# Patient Record
Sex: FEMALE | Race: BLACK OR AFRICAN AMERICAN | Employment: FULL TIME | URBAN - METROPOLITAN AREA
[De-identification: names, ages, dates, MRNs, and addresses within clinical notes are randomized per-mention and may not be internally consistent; named-entity substitution may affect disease eponyms.]

---

## 2018-03-28 PROBLEM — F43.10 POSTTRAUMATIC STRESS DISORDER: Status: ACTIVE | Noted: 2018-03-28

## 2018-03-28 PROBLEM — F33.1 MAJOR DEPRESSIVE DISORDER, RECURRENT EPISODE, MODERATE (HCC): Status: ACTIVE | Noted: 2018-03-28

## 2018-03-28 PROBLEM — F40.10 SOCIAL PHOBIA: Status: ACTIVE | Noted: 2018-03-28

## 2018-05-30 PROBLEM — F33.41 RECURRENT MAJOR DEPRESSION IN PARTIAL REMISSION (HCC): Status: ACTIVE | Noted: 2018-03-28

## 2019-10-16 PROBLEM — F32.9 MAJOR DEPRESSIVE DISORDER: Status: ACTIVE | Noted: 2018-03-28

## 2020-05-04 PROBLEM — F34.0 CYCLOTHYMIA: Status: ACTIVE | Noted: 2018-03-28

## 2020-05-04 PROBLEM — F39 MODERATE MOOD DISORDER (HCC): Status: ACTIVE | Noted: 2018-03-28

## 2021-06-21 LAB
CHLAMYDIA, EXTERNAL: NEGATIVE
N. GONORRHEA, EXTERNAL: NEGATIVE

## 2021-10-15 LAB
CHLAMYDIA, EXTERNAL: NEGATIVE
N. GONORRHEA, EXTERNAL: NEGATIVE

## 2021-11-05 LAB
ANTIBODY SCREEN, EXTERNAL: NEGATIVE
HBSAG, EXTERNAL: NEGATIVE
HIV, EXTERNAL: NON REACTIVE
RPR, EXTERNAL: NON REACTIVE
RUBELLA, EXTERNAL: NORMAL
TYPE, ABO & RH, EXTERNAL: NORMAL

## 2022-03-19 PROBLEM — F40.10 SOCIAL PHOBIA: Status: ACTIVE | Noted: 2018-03-28

## 2022-03-19 PROBLEM — F43.10 POSTTRAUMATIC STRESS DISORDER: Status: ACTIVE | Noted: 2018-03-28

## 2022-03-19 PROBLEM — F34.0 CYCLOTHYMIA: Status: ACTIVE | Noted: 2018-03-28

## 2022-04-25 LAB — GRBS, EXTERNAL: NEGATIVE

## 2022-05-02 ENCOUNTER — HOSPITAL ENCOUNTER (EMERGENCY)
Age: 22
Discharge: HOME | End: 2022-05-02
Attending: OBSTETRICS & GYNECOLOGY | Admitting: OBSTETRICS & GYNECOLOGY
Payer: COMMERCIAL

## 2022-05-02 VITALS
WEIGHT: 195 LBS | HEART RATE: 117 BPM | TEMPERATURE: 99.7 F | DIASTOLIC BLOOD PRESSURE: 71 MMHG | HEIGHT: 64 IN | SYSTOLIC BLOOD PRESSURE: 130 MMHG | RESPIRATION RATE: 16 BRPM | BODY MASS INDEX: 33.29 KG/M2 | OXYGEN SATURATION: 99 %

## 2022-05-02 PROCEDURE — 59025 FETAL NON-STRESS TEST: CPT

## 2022-05-02 PROCEDURE — 99284 EMERGENCY DEPT VISIT MOD MDM: CPT

## 2022-05-02 NOTE — PROGRESS NOTES
1902: pt placed on monitor. EFM and TOCO applied. 1905: SBAR report given to PARVEEN Amin RN. Care turned over at this time.

## 2022-05-02 NOTE — DISCHARGE INSTRUCTIONS
Patient Education     Patient Education   Patient Education      Follow up in office with provider. Counting Your Baby's Kicks: Care Instructions  Overview     Counting your baby's kicks is one way your doctor can tell that your baby is healthy. Most women--especially in a first pregnancy--feel their baby move for the first time between 16 and 22 weeks. The movement may feel like flutters rather than kicks. Your baby may move more at certain times of the day. When you are active, you may notice less kicking than when you are resting. At your prenatal visits, your doctor will ask whether the baby is active. In your last trimester, your doctor may ask you to count the number of times you feel your baby move. Follow-up care is a key part of your treatment and safety. Be sure to make and go to all appointments, and call your doctor if you are having problems. It's also a good idea to know your test results and keep a list of the medicines you take. How do you count fetal kicks? A common method of checking your baby's movement is to note the length of time it takes to count ten movements (such as kicks, flutters, or rolls). Pick your baby's most active time of day to count. This may be any time from morning to evening. If you don't feel 10 movements in an hour, have something to eat or drink and count for another hour. If you don't feel at least 10 movements in the 2-hour period, call your doctor. When should you call for help? Call your doctor now or seek immediate medical care if:    You noticed that your baby has stopped moving or is moving much less than normal.   Watch closely for changes in your health, and be sure to contact your doctor if you have any problems. Where can you learn more? Go to http://www.gray.com/  Enter [id number] in the search box to learn more about \"Counting Your Baby's Kicks: Care Instructions. \"  Current as of: June 16, 2021               Content Version: 13.2  © 2006-2022 [employer]. Care instructions adapted under license by [employer] (which disclaims liability or warranty for this information). If you have questions about a medical condition or this instruction, always ask your healthcare professional. [address] any warranty or liability for your use of this information. Roselie Rocker Contractions: Care Instructions  Your Care Instructions     [place] contractions prepare your uterus for labor. Think of them as a \"warm-up\" exercise that your body does. You may begin to feel them between the 28th and 30th weeks of your pregnancy. But they start as early as the 20th week. [place] contractions usually occur more often during the ninth month. They may go away when you are active and return when you rest. These contractions are like mild contractions of true labor, but they occur less often. (You feel fewer than 8 in an hour.) They don't cause your cervix to open. It may be hard for you to tell the difference between Roselie Rocker contractions and true labor, especially in your first pregnancy. Follow-up care is a key part of your treatment and safety. Be sure to make and go to all appointments, and call your doctor if you are having problems. It's also a good idea to know your test results and keep a list of the medicines you take. How can you care for yourself at home? · Try a warm bath to help relieve muscle tension and reduce pain. · Change positions every 30 minutes. Take breaks if you must sit for a long time. Get up and walk around. · Drink plenty of water. · Taking short walks may help you feel better. Your doctor needs to check any contractions that are getting stronger or closer together. Where can you learn more? Go to http://www.gray.com/  Enter Z402 in the search box to learn more about \"[place] Contractions: Care Instructions. \"  Current as of: June 16, 2021               Content Version: 13.2  © 6927-9807 [employer]. Care instructions adapted under license by [employer] (which disclaims liability or warranty for this information). If you have questions about a medical condition or this instruction, always ask your healthcare professional. [place] any warranty or liability for your use of this information. Week 40 of Your Pregnancy: Care Instructions  Overview     You are near the end of your pregnancy--and you're probably pretty uncomfortable. It may be harder to walk around. Lying down probably isn't comfortable either. You may have trouble getting to sleep or staying asleep. Most babies are born between 40 and 41 weeks. This is a good time to think about packing a bag for the hospital with items you'll need. Then you'll be ready when labor starts. Follow-up care is a key part of your treatment and safety. Be sure to make and go to all appointments, and call your doctor if you are having problems. It's also a good idea to know your test results and keep a list of the medicines you take. How can you care for yourself at home? Learn about breastfeeding  · Breastfeeding is best for your baby and good for you. · Breast milk has antibodies to help your baby fight infections. · If you breastfeed, you may lose weight faster. That's because making milk burns calories. · Learning the best ways to hold your baby will make breastfeeding easier. · Sometimes breastfeeding can make partners feel left out. If you have a partner, plan how you can care for your baby together. For example, your partner can bathe and diaper the baby. You can snuggle together when you breastfeed. · You may want to learn how to use a breast pump and store your milk. · If you choose to bottle feed, make the feeding feel like breastfeeding so you can bond with your baby. Always hold your baby and the bottle.  Don't prop bottles or let your baby fall asleep with a bottle. Learn about crying  · It's common for babies to cry for 1 to 3 hours a day. Some cry more, and some cry less. · Babies don't cry to make you upset or because you're a bad parent. · Crying is how your baby communicates. Your baby may be hungry; have gas; need a diaper change; or feel cold, warm, tired, lonely, or tense. Sometimes babies cry for unknown reasons. · If you respond to your baby's needs, your baby will learn to trust you. · Try to stay calm when your baby cries. Your baby may get more upset if they sense that you are upset. Know how to care for your   · Your baby's umbilical cord stump will drop off on its own, usually between 1 and 2 weeks. To care for your baby's umbilical cord area:  ? Clean the area at the bottom of the cord 2 or 3 times a day. ? Pay special attention to the area where the cord attaches to the skin. ? Keep the diaper folded below the cord. ? Use a damp washcloth or cotton ball to sponge bathe your baby until the stump has come off. · Your baby's first dark stool is called meconium. After the meconium is passed, your baby will develop their own bowel pattern. ? Some babies, especially  babies, have several bowel movements a day. Others have one or two a day, or one every 2 to 3 days. ?  babies often have loose, yellow stools. Formula-fed babies have more formed stools. ? If your baby's stools look like little pellets, your baby is constipated. After 2 days of constipation, call your baby's doctor. · If your baby will be circumcised, you can care for your baby at home. ? Gently rinse your baby's penis with warm water after every diaper change. Don't try to remove the film that forms on the penis. This film will go away on its own. Pat dry. ? Put petroleum ointment, such as Vaseline, on the area of the diaper that will touch your baby's penis.  This will keep the diaper from sticking to your baby.  ? Ask the doctor about giving your baby acetaminophen (Tylenol) for pain. Where can you learn more? Go to http://www.gray.com/  Enter N257 in the search box to learn more about \"Week 37 of Your Pregnancy: Care Instructions. \"  Current as of: June 16, 2021               Content Version: 13.2  © 3183-4850 [employer]. Care instructions adapted under license by [employer] (which disclaims liability or warranty for this information). If you have questions about a medical condition or this instruction, always ask your healthcare professional. [place] [address] any warranty or liability for your use of this information.

## 2022-05-02 NOTE — PROGRESS NOTES
Qamar Monique is a 23 y.o.   at Unknown patient of Dr Nikki Fraga at [facility] who presents to L&D triage with c/o contractions every 10 minutes for 2 and half hours and loss of mucus plug. She reports Positive FM, denies vaginal bleeding and LOF. She also denies Headaches, Scotoma, RUQ pain and Edema. Urine sample obtained. EFM and toco placed for initial assessment. Pt reports sexual activity in the past 48 hours. Pt was last seen on 22 and reports cervical dilation of 1 cm.       :  Dr. Wade Crest in with pt. Strip reviewed. SVE performed with consent from pt. POC to discharge pt home at this time. :  Discharge instructions reviewed with pt. No questions at this time. Pt discharged home.

## 2022-05-03 NOTE — PROCEDURES
Non-Stress Test    Brief history, indication: 36 week IUP, contractions    Findings:  Baseline  bpm; moderate variability; greater than two accelerations to 150 bpm; no significant decelerations noted.     Result: Reactive NST    Nonstress test interpreted by myself      Tika Martin MD

## 2022-05-03 NOTE — H&P
OB History & Physical    Name: Joe Vasquez MRN: [id number]  SSN: [id number]    YOB: 2000  Age: 23 y.o. Sex: female      Subjective:     Reason for Admission:  Pregnancy and contractions    History of Present Illness: Joe Vasquez is a 23 y.o.  female with an estimated gestational age of 36w7d with Estimated Date of Delivery: 22. Patient complains of gradually increasing contractions since earlier this afternoon. She denies bleeding, ROM. The baby has been active. OB History        2    Para        Term                AB   1    Living           SAB   1    IAB        Ectopic        Molar        Multiple        Live Births                  Past Medical History:   Diagnosis Date    Asthma     Hx of multiple concussions     while playing soccer    Hx of streptococcal infection     7 X last year    Psychiatric problem     Bipolar. Anxiety, Depression     Past Surgical History:   Procedure Laterality Date    HX KNEE ARTHROSCOPY      HX OTHER SURGICAL      Ligament reconstruction in left elbow    HX OTHER SURGICAL      Cyst removal of left ankle    HX TONSILLECTOMY  2018     Social History     Occupational History    Not on file   Tobacco Use    Smoking status: Never Smoker    Smokeless tobacco: Never Used   Substance and Sexual Activity    Alcohol use: Never    Drug use: Never    Sexual activity: Yes     Partners: Male     Birth control/protection: Implant     Family History   Problem Relation Age of Onset    Other Father         incarcerated for drugs and conspiracy to murder     SH:  Patient denies tobacco, alcohol, recreational drugs. .     FH: reviewed and negative    Review of systems:    General: Denies fever, sweats, chills  CV: Denies CP, palpitations  Resp: Denies SOB, cough  GI: Denies nausea, vomiting, diarrhea  : Denies dysura, abnormal frequency, hematuria  Neuro: Denies HA, visual disturbance  All other systems reviewed and are negative    No Known Allergies  Prior to Admission medications    Medication Sig Start Date End Date Taking? Authorizing Provider   PNV Comb #2-Iron-FA-Omega 3 29-1-400 mg cmpk Take  by mouth. Yes Provider, Historical   beclomethasone (QVAR) 40 mcg/actuation aero Take 2 Puffs by inhalation. 17  Yes Provider, Historical   lamoTRIgine (LaMICtal) 100 mg tablet Take 1 Tab by mouth nightly. After 2 weeks increase to 1.5 tabs  Patient not taking: Reported on 2022   Dre Dc, TIMMY   albuterol (VENTOLIN HFA) 90 mcg/actuation inhaler Take 2 Puffs by inhalation. 17   Provider, Historical   montelukast (SINGULAIR) 10 mg tablet Take 10 mg by mouth. Patient not taking: Reported on 2022   Provider, Historical   tazorotene (TAZORAC) 0.05 % topical cream  18   Provider, Historical   etonogestrel (NEXPLANON) 68 mg impl by SubDERmal route.   Patient not taking: Reported on 2022    Provider, Historical        Objective:     Vitals:    Vitals:    22 1907 22 1914 22 1938 22 1940   BP: 128/76   130/71   Pulse: (!) 136   (!) 117   Resp: 16      Temp: 99.7 °F (37.6 °C)      SpO2: 98%  99%    Weight:  88.5 kg (195 lb)     Height:  5' 4\" (1.626 m)        Temp (24hrs), Av.7 °F (37.6 °C), Min:99.7 °F (37.6 °C), Max:99.7 °F (37.6 °C)    BP  Min: 128/76  Max: 130/71     Physical Exam  General: in NAD, psychologically stable  Neck: normal ROM  Back: no CVAT  Heart: regular rate and rhythm  Respiratory: unlabored breathing  Abdomen: Gravid, soft, nontender, no abnormal masses, rebound, rigidity, guarding  Fundus: soft, nontender  Extremities: no abnormal cyanosis, clubbing, edema  Skin: warm, dry, no abnormal lesions noted  Neurological: DTR's 1-2+ no clonus  Pelvic:Cervical Exam: /-2  Uterine Activity: occasional contractions, palpate mild  Membranes: no gross evidence of ROM  Fetal Heart Rate: adequate variability and reactivity; no significant abnormal decelerations    Patient Active Problem List   Diagnosis Code    Asthma, cough variant J45.991    Seasonal allergic rhinitis J30.2    Social phobia F40.10    Cyclothymia F34.0    Posttraumatic stress disorder F43.10     Assessment and Plan:     36 week IUP, false labor  Discharge home  Follow-up with the office tomorrow morning  Precautions reviewed; questions answered    Signed By:  Kip Burroughs MD     May 2, 2022

## 2022-05-04 ENCOUNTER — HOSPITAL ENCOUNTER (EMERGENCY)
Age: 22
Discharge: HOME | End: 2022-05-05
Attending: OBSTETRICS & GYNECOLOGY | Admitting: OBSTETRICS & GYNECOLOGY
Payer: COMMERCIAL

## 2022-05-04 VITALS
TEMPERATURE: 98.2 F | HEART RATE: 105 BPM | DIASTOLIC BLOOD PRESSURE: 63 MMHG | OXYGEN SATURATION: 97 % | BODY MASS INDEX: 33.46 KG/M2 | WEIGHT: 196 LBS | SYSTOLIC BLOOD PRESSURE: 129 MMHG | HEIGHT: 64 IN | RESPIRATION RATE: 16 BRPM

## 2022-05-05 PROCEDURE — 99284 EMERGENCY DEPT VISIT MOD MDM: CPT

## 2022-05-05 NOTE — DISCHARGE INSTRUCTIONS
Patient Education        Week 40 of Your Pregnancy: Care Instructions  Overview     You are near the end of your pregnancy--and you're probably pretty uncomfortable. It may be harder to walk around. Lying down probably isn't comfortable either. You may have trouble getting to sleep or staying asleep. Most babies are born between 40 and 41 weeks. This is a good time to think about packing a bag for the hospital with items you'll need. Then you'll be ready when labor starts. Follow-up care is a key part of your treatment and safety. Be sure to make and go to all appointments, and call your doctor if you are having problems. It's also a good idea to know your test results and keep a list of the medicines you take. How can you care for yourself at home? Learn about breastfeeding  · Breastfeeding is best for your baby and good for you. · Breast milk has antibodies to help your baby fight infections. · If you breastfeed, you may lose weight faster. That's because making milk burns calories. · Learning the best ways to hold your baby will make breastfeeding easier. · Sometimes breastfeeding can make partners feel left out. If you have a partner, plan how you can care for your baby together. For example, your partner can bathe and diaper the baby. You can snuggle together when you breastfeed. · You may want to learn how to use a breast pump and store your milk. · If you choose to bottle feed, make the feeding feel like breastfeeding so you can bond with your baby. Always hold your baby and the bottle. Don't prop bottles or let your baby fall asleep with a bottle. Learn about crying  · It's common for babies to cry for 1 to 3 hours a day. Some cry more, and some cry less. · Babies don't cry to make you upset or because you're a bad parent. · Crying is how your baby communicates. Your baby may be hungry; have gas; need a diaper change; or feel cold, warm, tired, lonely, or tense.  Sometimes babies cry for unknown reasons. · If you respond to your baby's needs, your baby will learn to trust you. · Try to stay calm when your baby cries. Your baby may get more upset if they sense that you are upset. Know how to care for your   · Your baby's umbilical cord stump will drop off on its own, usually between 1 and 2 weeks. To care for your baby's umbilical cord area:  ? Clean the area at the bottom of the cord 2 or 3 times a day. ? Pay special attention to the area where the cord attaches to the skin. ? Keep the diaper folded below the cord. ? Use a damp washcloth or cotton ball to sponge bathe your baby until the stump has come off. · Your baby's first dark stool is called meconium. After the meconium is passed, your baby will develop their own bowel pattern. ? Some babies, especially  babies, have several bowel movements a day. Others have one or two a day, or one every 2 to 3 days. ?  babies often have loose, yellow stools. Formula-fed babies have more formed stools. ? If your baby's stools look like little pellets, your baby is constipated. After 2 days of constipation, call your baby's doctor. · If your baby will be circumcised, you can care for your baby at home. ? Gently rinse your baby's penis with warm water after every diaper change. Don't try to remove the film that forms on the penis. This film will go away on its own. Pat dry. ? Put petroleum ointment, such as Vaseline, on the area of the diaper that will touch your baby's penis. This will keep the diaper from sticking to your baby. ? Ask the doctor about giving your baby acetaminophen (Tylenol) for pain. Where can you learn more? Go to http://www.gray.com/  Enter N257 in the search box to learn more about \"Week 37 of Your Pregnancy: Care Instructions. \"  Current as of: 2021               Content Version: 13.2  © 3583-2567 [employer].    Care instructions adapted under license by [employer] (which disclaims liability or warranty for this information). If you have questions about a medical condition or this instruction, always ask your healthcare professional. [address] any warranty or liability for your use of this information.

## 2022-05-05 NOTE — PROGRESS NOTES
[address] [facility] Loida Perkins is a 23 y.o.   at 37w2d patient of Dr Vinay Rutledge at [facility] who presents to L&D triage with c/o Contractions. She reports Positive FM, denies vaginal bleeding and LOF. She also denies Headaches, Scotoma, RUQ pain and Edema. Urine sample obtained. EFM and toco placed for initial assessment. 2348. Dr. Ernestine Honeycutt at the bedside to assess patient and her cervix. Cervix unchanged from last visit. Verbal orders received to let patient walk or roll on birthing ball for about an hour and he will re-check her cervix then.    0003. Patient placed onto birthing ball and taken off of fetal monitoring for a reactive strip.     0053. Dr. Ernestine Honeycutt at the bedside to assess patients cervix again. Cervix unchanged. Verbal orders received to discharge patient home. 0110. Patient discharged home with instructions. Patient understands discharge instructions and has had all questions answered.

## 2022-05-05 NOTE — H&P
OB History & Physical    Name: Maegan Medrano MRN: [id number]  SSN: [id number]    YOB: 2000  Age: 23 y.o. Sex: female      Subjective:     Reason for Admission:  Pregnancy and  contractions    History of Present Illness: Maegan Medrano is a 23 y.o.  female with an estimated gestational age of 42w2d with Estimated Date of Delivery: 22. Patient complains of cramping and contractions since this morning. She reports mucous discharge, no bleeding, ROM. The baby has been active. OB History        2    Para        Term                AB   1    Living           SAB   1    IAB        Ectopic        Molar        Multiple        Live Births                  Past Medical History:   Diagnosis Date    Asthma     Hx of multiple concussions     while playing soccer    Hx of streptococcal infection     7 X last year    Psychiatric problem     Bipolar. Anxiety, Depression     Past Surgical History:   Procedure Laterality Date    HX KNEE ARTHROSCOPY      HX OTHER SURGICAL      Ligament reconstruction in left elbow    HX OTHER SURGICAL      Cyst removal of left ankle    HX TONSILLECTOMY  2018     Social History     Occupational History    Not on file   Tobacco Use    Smoking status: Never Smoker    Smokeless tobacco: Never Used   Substance and Sexual Activity    Alcohol use: Never    Drug use: Never    Sexual activity: Yes     Partners: Male     Birth control/protection: Implant     Family History   Problem Relation Age of Onset    Other Father         incarcerated for drugs and conspiracy to murder     SH:  Patient denies tobacco, alcohol, recreational drugs. .     FH: reviewed and negative    Review of systems:    General: Denies fever, sweats, chills  CV: Denies CP, palpitations  Resp: Denies SOB, cough  GI: Denies nausea, vomiting, diarrhea  : Denies dysura, abnormal frequency, hematuria  Neuro: Denies HA, visual disturbance  All other systems reviewed and are negative    No Known Allergies  Prior to Admission medications    Medication Sig Start Date End Date Taking? Authorizing Provider   PNV Comb #2-Iron-FA-Omega 3 29-1-400 mg cmpk Take  by mouth. Provider, Historical   lamoTRIgine (LaMICtal) 100 mg tablet Take 1 Tab by mouth nightly. After 2 weeks increase to 1.5 tabs  Patient not taking: Reported on 2022   Jony Mehta NP   albuterol (VENTOLIN HFA) 90 mcg/actuation inhaler Take 2 Puffs by inhalation. 17   Provider, Historical   beclomethasone (QVAR) 40 mcg/actuation aero Take 2 Puffs by inhalation. 17   Provider, Historical   montelukast (SINGULAIR) 10 mg tablet Take 10 mg by mouth. Patient not taking: Reported on 2022   Provider, Historical   tazorotene (TAZORAC) 0.05 % topical cream  18   Provider, Historical   etonogestrel (NEXPLANON) 68 mg impl by SubDERmal route.   Patient not taking: Reported on 2022    Provider, Historical        Objective:     Vitals:    Vitals:    22 2335 22 2346   BP: 129/63    Pulse: (!) 105    Resp: 16    Temp: 98.2 °F (36.8 °C)    SpO2: 97%    Weight:  88.9 kg (196 lb)   Height:  5' 4\" (1.626 m)      Temp (24hrs), Av.2 °F (36.8 °C), Min:98.2 °F (36.8 °C), Max:98.2 °F (36.8 °C)    BP  Min: 129/63  Max: 129/63     Physical Exam  General: in NAD, psychologically stable  Neck: normal ROM  Back: no CVAT  Heart: regular rate and rhythm  Respiratory: unlabored breathing  Abdomen: Gravid, soft, nontender, no abnormal masses, rebound, rigidity, guarding  Fundus: soft, nontender  Extremities: no abnormal cyanosis, clubbing, edema  Skin: warm, dry, no abnormal lesions noted  Neurological: DTR's 1-2+ no clonus  Pelvic:Cervical Exam: 1-2/70/-2  Uterine Activity: irregular  Membranes: no gross evidence of ROM  Fetal Heart Rate: adequate variability and reactivity; no significant abnormal decelerations    Patient Active Problem List   Diagnosis Code    Asthma, cough variant J45.991    Seasonal allergic rhinitis J30.2    Social phobia F40.10    Cyclothymia F34.0    Posttraumatic stress disorder F43.10     Assessment and Plan:     37 week IUP, false labor---patient was observed over 1 1/2 hours, no change in contractions, cervix recheck no change  Discharge home  Follow-up scheduled in the office Thursday  Precautions discussed; questions answered.     Signed By:  Regino Jaeger MD     May 5, 2022

## 2022-05-19 ENCOUNTER — HOSPITAL ENCOUNTER (INPATIENT)
Age: 22
LOS: 3 days | Discharge: HOME | DRG: 540 | End: 2022-05-22
Attending: OBSTETRICS & GYNECOLOGY | Admitting: OBSTETRICS & GYNECOLOGY
Payer: COMMERCIAL

## 2022-05-19 DIAGNOSIS — G89.18 POST-OP PAIN: Primary | ICD-10-CM

## 2022-05-19 PROBLEM — Z34.90 PREGNANCY: Status: ACTIVE | Noted: 2022-05-19

## 2022-05-19 LAB
ABO + RH BLD: NORMAL
AMPHET UR QL SCN: NEGATIVE
BARBITURATES UR QL SCN: NEGATIVE
BASOPHILS # BLD: 0 K/UL (ref 0–0.1)
BASOPHILS NFR BLD: 0 % (ref 0–1)
BENZODIAZ UR QL: NEGATIVE
BLOOD GROUP ANTIBODIES SERPL: NORMAL
CANNABINOIDS UR QL SCN: NEGATIVE
COCAINE UR QL SCN: NEGATIVE
DIFFERENTIAL METHOD BLD: ABNORMAL
DRUG SCRN COMMENT,DRGCM: NORMAL
EOSINOPHIL # BLD: 0.2 K/UL (ref 0–0.4)
EOSINOPHIL NFR BLD: 1 % (ref 0–7)
ERYTHROCYTE [DISTWIDTH] IN BLOOD BY AUTOMATED COUNT: 14.1 % (ref 11.5–14.5)
HCT VFR BLD AUTO: 38.5 % (ref 35–47)
HGB BLD-MCNC: 12.4 G/DL (ref 11.5–16)
IMM GRANULOCYTES # BLD AUTO: 0.1 K/UL (ref 0–0.04)
IMM GRANULOCYTES NFR BLD AUTO: 1 % (ref 0–0.5)
LYMPHOCYTES # BLD: 2.6 K/UL (ref 0.8–3.5)
LYMPHOCYTES NFR BLD: 20 % (ref 12–49)
MCH RBC QN AUTO: 30.5 PG (ref 26–34)
MCHC RBC AUTO-ENTMCNC: 32.2 G/DL (ref 30–36.5)
MCV RBC AUTO: 94.6 FL (ref 80–99)
METHADONE UR QL: NEGATIVE
MONOCYTES # BLD: 1.2 K/UL (ref 0–1)
MONOCYTES NFR BLD: 9 % (ref 5–13)
NEUTS SEG # BLD: 9.3 K/UL (ref 1.8–8)
NEUTS SEG NFR BLD: 69 % (ref 32–75)
NRBC # BLD: 0 K/UL (ref 0–0.01)
NRBC BLD-RTO: 0 PER 100 WBC
OPIATES UR QL: NEGATIVE
PCP UR QL: NEGATIVE
PLATELET # BLD AUTO: 347 K/UL (ref 150–400)
PMV BLD AUTO: 10.4 FL (ref 8.9–12.9)
RBC # BLD AUTO: 4.07 M/UL (ref 3.8–5.2)
SPECIMEN EXP DATE BLD: NORMAL
WBC # BLD AUTO: 13.4 K/UL (ref 3.6–11)

## 2022-05-19 PROCEDURE — 74011250637 HC RX REV CODE- 250/637: Performed by: OBSTETRICS & GYNECOLOGY

## 2022-05-19 PROCEDURE — 36415 COLL VENOUS BLD VENIPUNCTURE: CPT

## 2022-05-19 PROCEDURE — 65410000002 HC RM PRIVATE OB

## 2022-05-19 PROCEDURE — 74011250636 HC RX REV CODE- 250/636: Performed by: OBSTETRICS & GYNECOLOGY

## 2022-05-19 PROCEDURE — 86900 BLOOD TYPING SEROLOGIC ABO: CPT

## 2022-05-19 PROCEDURE — 80307 DRUG TEST PRSMV CHEM ANLYZR: CPT

## 2022-05-19 PROCEDURE — 85025 COMPLETE CBC W/AUTO DIFF WBC: CPT

## 2022-05-19 RX ORDER — ONDANSETRON 2 MG/ML
4 INJECTION INTRAMUSCULAR; INTRAVENOUS
Status: DISCONTINUED | OUTPATIENT
Start: 2022-05-19 | End: 2022-05-21 | Stop reason: HOSPADM

## 2022-05-19 RX ORDER — SODIUM CHLORIDE 0.9 % (FLUSH) 0.9 %
5-40 SYRINGE (ML) INJECTION AS NEEDED
Status: DISCONTINUED | OUTPATIENT
Start: 2022-05-19 | End: 2022-05-22 | Stop reason: HOSPADM

## 2022-05-19 RX ORDER — SODIUM CHLORIDE 0.9 % (FLUSH) 0.9 %
5-40 SYRINGE (ML) INJECTION EVERY 8 HOURS
Status: DISCONTINUED | OUTPATIENT
Start: 2022-05-19 | End: 2022-05-20

## 2022-05-19 RX ORDER — NALOXONE HYDROCHLORIDE 0.4 MG/ML
0.4 INJECTION, SOLUTION INTRAMUSCULAR; INTRAVENOUS; SUBCUTANEOUS AS NEEDED
Status: DISCONTINUED | OUTPATIENT
Start: 2022-05-19 | End: 2022-05-20

## 2022-05-19 RX ORDER — ACETAMINOPHEN 500 MG
1000 TABLET ORAL EVERY 8 HOURS
Status: DISCONTINUED | OUTPATIENT
Start: 2022-05-19 | End: 2022-05-20

## 2022-05-19 RX ORDER — MAG HYDROX/ALUMINUM HYD/SIMETH 200-200-20
30 SUSPENSION, ORAL (FINAL DOSE FORM) ORAL
Status: DISCONTINUED | OUTPATIENT
Start: 2022-05-19 | End: 2022-05-21 | Stop reason: HOSPADM

## 2022-05-19 RX ORDER — OXYTOCIN/RINGER'S LACTATE 30/500 ML
10 PLASTIC BAG, INJECTION (ML) INTRAVENOUS AS NEEDED
Status: DISCONTINUED | OUTPATIENT
Start: 2022-05-19 | End: 2022-05-22 | Stop reason: HOSPADM

## 2022-05-19 RX ORDER — OXYTOCIN/RINGER'S LACTATE 30/500 ML
87.3 PLASTIC BAG, INJECTION (ML) INTRAVENOUS AS NEEDED
Status: DISCONTINUED | OUTPATIENT
Start: 2022-05-19 | End: 2022-05-22 | Stop reason: HOSPADM

## 2022-05-19 RX ORDER — BUTORPHANOL TARTRATE 2 MG/ML
1 INJECTION INTRAMUSCULAR; INTRAVENOUS
Status: DISCONTINUED | OUTPATIENT
Start: 2022-05-19 | End: 2022-05-21 | Stop reason: HOSPADM

## 2022-05-19 RX ORDER — SODIUM CHLORIDE, SODIUM LACTATE, POTASSIUM CHLORIDE, CALCIUM CHLORIDE 600; 310; 30; 20 MG/100ML; MG/100ML; MG/100ML; MG/100ML
125 INJECTION, SOLUTION INTRAVENOUS CONTINUOUS
Status: DISCONTINUED | OUTPATIENT
Start: 2022-05-19 | End: 2022-05-22 | Stop reason: HOSPADM

## 2022-05-19 RX ADMIN — BUTORPHANOL TARTRATE 1 MG: 2 INJECTION, SOLUTION INTRAMUSCULAR; INTRAVENOUS at 19:36

## 2022-05-19 RX ADMIN — Medication 25 MCG: at 18:23

## 2022-05-19 NOTE — PROGRESS NOTES
Problem: Vaginal Delivery: Day of Deliver-Laboring  Goal: Activity/Safety  Outcome: Progressing Towards Goal  Goal: Nutrition/Diet  Outcome: Progressing Towards Goal  Goal: Medications  Outcome: Progressing Towards Goal  Goal: Respiratory  Outcome: Progressing Towards Goal  Goal: Treatments/Interventions/Procedures  Outcome: Progressing Towards Goal  Goal: *Vital signs within defined limits  Outcome: Progressing Towards Goal  Goal: *Labs within defined limits  Outcome: Progressing Towards Goal

## 2022-05-19 NOTE — H&P
History & Physical    Name: Loida Perkins MRN: [id number]  SSN: [id number]    YOB: 2000  Age: 23 y.o. Sex: female      Subjective:     Estimated Date of Delivery: 22  OB History    Para Term  AB Living   2       1     SAB IAB Ectopic Molar Multiple Live Births   1                # Outcome Date GA Lbr Ulises/2nd Weight Sex Delivery Anes PTL Lv   2 Current            1 SAB 22               Ms. Jerry Ontiveros is admitted with pregnancy at 39w2d for induction of labor due to elective. Prenatal course was normal.  Please see prenatal records for details. Past Medical History:   Diagnosis Date    Asthma     Hx of multiple concussions     while playing soccer    Hx of streptococcal infection     7 X last year    Psychiatric problem     Bipolar. Anxiety, Depression     Past Surgical History:   Procedure Laterality Date    HX KNEE ARTHROSCOPY      HX OTHER SURGICAL      Ligament reconstruction in left elbow    HX OTHER SURGICAL      Cyst removal of left ankle    HX TONSILLECTOMY  2018     Social History     Occupational History    Not on file   Tobacco Use    Smoking status: Never Smoker    Smokeless tobacco: Never Used   Substance and Sexual Activity    Alcohol use: Never    Drug use: Never    Sexual activity: Yes     Partners: Male     Birth control/protection: Implant     Family History   Problem Relation Age of Onset    Other Father         incarcerated for drugs and conspiracy to murder       No Known Allergies  Prior to Admission medications    Medication Sig Start Date End Date Taking? Authorizing Provider   PNV Comb #2-Iron-FA-Omega 3 29-1-400 mg cmpk Take  by mouth. Yes Provider, Historical   beclomethasone (QVAR) 40 mcg/actuation aero Take 2 Puffs by inhalation. 17  Yes Provider, Historical   lamoTRIgine (LaMICtal) 100 mg tablet Take 1 Tab by mouth nightly.  After 2 weeks increase to 1.5 tabs  Patient not taking: Reported on 2022   Olinda TIMMY Benoit   albuterol (VENTOLIN HFA) 90 mcg/actuation inhaler Take 2 Puffs by inhalation. 17   Provider, Historical   montelukast (SINGULAIR) 10 mg tablet Take 10 mg by mouth. Patient not taking: Reported on 2022   Provider, Historical   tazorotene (TAZORAC) 0.05 % topical cream  18   Provider, Historical   etonogestrel (NEXPLANON) 68 mg impl by SubDERmal route. Patient not taking: Reported on 2022    Provider, Historical        Review of Systems: A comprehensive review of systems was negative except for that written in the History of Present Illness. Objective:     Vitals:  Vitals:    22 1623   BP: 124/73   Pulse: 94   Resp: 16   Temp: 98.2 °F (36.8 °C)   SpO2: 98%        Physical Exam:  Patient without distress. Abdomen: soft, nontender  Cervical Exam: 1/50/-3. FB placed without difficulty. 60 uterine/60 vaginal   Lower Extremities:  - Edema 1+  Membranes:  Intact  Fetal Heart Rate: Reactive          Prenatal Labs:   No results found for: ABORH, RUBELLAEXT, HBSAGEXT, HIVEXT, RPREXT, GONNOEXT, CHLAMEXT, ABORHEXT, RUBELLAEXT, GRBSEXT, HBSAGEXT, HIVEXT, RPREXT, GONNOEXT, CHLAMEXT    Impression/Plan:   Jenny Schwartz is a 23 y.o.  at 44w2d   IOL, elective   GBS negative       Plan:   Admit for induction of labor  FB   Buccal cytotec q4 hours     Jessi Shrestha, [id number]

## 2022-05-19 NOTE — PROGRESS NOTES
1915: Bedside and Verbal shift change report given to BARAK Baldwin, RN and Aubrey Arenas, RN  (oncoming nurse) by KRISTA Jones RN  (offgoing nurse). Report included the following information SBAR and Kardex. [id number]: Difficulty tracing. RN at bedside to adjust EFM. 0720: Bedside and Verbal shift change report given to MAUREEN Pete, SARAH and SAGE Parks RN  (oncoming nurse) by BARAK Baldwin RN and Aubrey Arenas RN  (offgoing nurse).  Report included the following information SBAR and Kardex. '

## 2022-05-20 ENCOUNTER — ANESTHESIA EVENT (OUTPATIENT)
Dept: LABOR AND DELIVERY | Age: 22
DRG: 540 | End: 2022-05-20
Payer: COMMERCIAL

## 2022-05-20 ENCOUNTER — ANESTHESIA (OUTPATIENT)
Dept: LABOR AND DELIVERY | Age: 22
DRG: 540 | End: 2022-05-20
Payer: COMMERCIAL

## 2022-05-20 PROCEDURE — 65410000002 HC RM PRIVATE OB

## 2022-05-20 PROCEDURE — 10907ZC DRAINAGE OF AMNIOTIC FLUID, THERAPEUTIC FROM PRODUCTS OF CONCEPTION, VIA NATURAL OR ARTIFICIAL OPENING: ICD-10-PCS | Performed by: OBSTETRICS & GYNECOLOGY

## 2022-05-20 PROCEDURE — 74011000250 HC RX REV CODE- 250

## 2022-05-20 PROCEDURE — 76060000032 HC ANESTHESIA 0.5 TO 1 HR: Performed by: OBSTETRICS & GYNECOLOGY

## 2022-05-20 PROCEDURE — 74011000250 HC RX REV CODE- 250: Performed by: NURSE ANESTHETIST, CERTIFIED REGISTERED

## 2022-05-20 PROCEDURE — 74011250636 HC RX REV CODE- 250/636: Performed by: OBSTETRICS & GYNECOLOGY

## 2022-05-20 PROCEDURE — 74011250637 HC RX REV CODE- 250/637: Performed by: OBSTETRICS & GYNECOLOGY

## 2022-05-20 PROCEDURE — 74011250636 HC RX REV CODE- 250/636: Performed by: NURSE ANESTHETIST, CERTIFIED REGISTERED

## 2022-05-20 PROCEDURE — 74011000250 HC RX REV CODE- 250: Performed by: OBSTETRICS & GYNECOLOGY

## 2022-05-20 PROCEDURE — 74011000250 HC RX REV CODE- 250: Performed by: STUDENT IN AN ORGANIZED HEALTH CARE EDUCATION/TRAINING PROGRAM

## 2022-05-20 PROCEDURE — 77010026065 HC OXYGEN MINIMUM MEDICAL AIR

## 2022-05-20 PROCEDURE — 75410000002 HC LABOR FEE PER 1 HR

## 2022-05-20 PROCEDURE — 76060000078 HC EPIDURAL ANESTHESIA: Performed by: OBSTETRICS & GYNECOLOGY

## 2022-05-20 PROCEDURE — 77030014125 HC TY EPDRL BBMI -B: Performed by: STUDENT IN AN ORGANIZED HEALTH CARE EDUCATION/TRAINING PROGRAM

## 2022-05-20 PROCEDURE — 74011250636 HC RX REV CODE- 250/636: Performed by: ANESTHESIOLOGY

## 2022-05-20 PROCEDURE — 76060000078 HC EPIDURAL ANESTHESIA

## 2022-05-20 PROCEDURE — 76010000391 HC C SECN FIRST 1 HR: Performed by: OBSTETRICS & GYNECOLOGY

## 2022-05-20 PROCEDURE — 75410000003 HC RECOV DEL/VAG/CSECN EA 0.5 HR

## 2022-05-20 RX ORDER — ACETAMINOPHEN 500 MG
1000 TABLET ORAL EVERY 8 HOURS
Status: DISCONTINUED | OUTPATIENT
Start: 2022-05-20 | End: 2022-05-22 | Stop reason: HOSPADM

## 2022-05-20 RX ORDER — PHENYLEPHRINE HCL IN 0.9% NACL 0.4MG/10ML
SYRINGE (ML) INTRAVENOUS AS NEEDED
Status: DISCONTINUED | OUTPATIENT
Start: 2022-05-20 | End: 2022-05-20 | Stop reason: HOSPADM

## 2022-05-20 RX ORDER — HYDROCORTISONE ACETATE PRAMOXINE HCL 2.5; 1 G/100G; G/100G
CREAM TOPICAL AS NEEDED
Status: DISCONTINUED | OUTPATIENT
Start: 2022-05-20 | End: 2022-05-22 | Stop reason: HOSPADM

## 2022-05-20 RX ORDER — OXYTOCIN/RINGER'S LACTATE 30/500 ML
10 PLASTIC BAG, INJECTION (ML) INTRAVENOUS AS NEEDED
Status: DISCONTINUED | OUTPATIENT
Start: 2022-05-20 | End: 2022-05-22 | Stop reason: HOSPADM

## 2022-05-20 RX ORDER — SODIUM CHLORIDE 0.9 % (FLUSH) 0.9 %
5-40 SYRINGE (ML) INJECTION AS NEEDED
Status: DISCONTINUED | OUTPATIENT
Start: 2022-05-20 | End: 2022-05-20

## 2022-05-20 RX ORDER — NALOXONE HYDROCHLORIDE 0.4 MG/ML
0.4 INJECTION, SOLUTION INTRAMUSCULAR; INTRAVENOUS; SUBCUTANEOUS AS NEEDED
Status: DISCONTINUED | OUTPATIENT
Start: 2022-05-20 | End: 2022-05-22 | Stop reason: HOSPADM

## 2022-05-20 RX ORDER — SODIUM CHLORIDE 0.9 % (FLUSH) 0.9 %
5-40 SYRINGE (ML) INJECTION EVERY 8 HOURS
Status: DISCONTINUED | OUTPATIENT
Start: 2022-05-20 | End: 2022-05-22 | Stop reason: HOSPADM

## 2022-05-20 RX ORDER — SIMETHICONE 80 MG
80 TABLET,CHEWABLE ORAL
Status: DISCONTINUED | OUTPATIENT
Start: 2022-05-20 | End: 2022-05-22 | Stop reason: HOSPADM

## 2022-05-20 RX ORDER — NALOXONE HYDROCHLORIDE 0.4 MG/ML
0.2 INJECTION, SOLUTION INTRAMUSCULAR; INTRAVENOUS; SUBCUTANEOUS
Status: ACTIVE | OUTPATIENT
Start: 2022-05-20 | End: 2022-05-21

## 2022-05-20 RX ORDER — DOCUSATE SODIUM 100 MG/1
100 CAPSULE, LIQUID FILLED ORAL DAILY
Status: DISCONTINUED | OUTPATIENT
Start: 2022-05-21 | End: 2022-05-22 | Stop reason: HOSPADM

## 2022-05-20 RX ORDER — BUPIVACAINE HYDROCHLORIDE AND EPINEPHRINE 2.5; 5 MG/ML; UG/ML
INJECTION, SOLUTION EPIDURAL; INFILTRATION; INTRACAUDAL; PERINEURAL AS NEEDED
Status: DISCONTINUED | OUTPATIENT
Start: 2022-05-20 | End: 2022-05-20 | Stop reason: HOSPADM

## 2022-05-20 RX ORDER — ONDANSETRON 2 MG/ML
INJECTION INTRAMUSCULAR; INTRAVENOUS AS NEEDED
Status: DISCONTINUED | OUTPATIENT
Start: 2022-05-20 | End: 2022-05-20 | Stop reason: HOSPADM

## 2022-05-20 RX ORDER — LIDOCAINE HYDROCHLORIDE AND EPINEPHRINE 20; 5 MG/ML; UG/ML
INJECTION, SOLUTION EPIDURAL; INFILTRATION; INTRACAUDAL; PERINEURAL AS NEEDED
Status: DISCONTINUED | OUTPATIENT
Start: 2022-05-20 | End: 2022-05-20 | Stop reason: HOSPADM

## 2022-05-20 RX ORDER — OXYCODONE HYDROCHLORIDE 5 MG/1
5 TABLET ORAL
Status: DISCONTINUED | OUTPATIENT
Start: 2022-05-20 | End: 2022-05-22 | Stop reason: HOSPADM

## 2022-05-20 RX ORDER — LIDOCAINE HYDROCHLORIDE AND EPINEPHRINE 20; 5 MG/ML; UG/ML
INJECTION, SOLUTION EPIDURAL; INFILTRATION; INTRACAUDAL; PERINEURAL
Status: COMPLETED
Start: 2022-05-20 | End: 2022-05-20

## 2022-05-20 RX ORDER — HYDROMORPHONE HYDROCHLORIDE 1 MG/ML
1 INJECTION, SOLUTION INTRAMUSCULAR; INTRAVENOUS; SUBCUTANEOUS
Status: DISCONTINUED | OUTPATIENT
Start: 2022-05-20 | End: 2022-05-22 | Stop reason: HOSPADM

## 2022-05-20 RX ORDER — IBUPROFEN 400 MG/1
800 TABLET ORAL EVERY 8 HOURS
Status: DISCONTINUED | OUTPATIENT
Start: 2022-05-20 | End: 2022-05-22 | Stop reason: HOSPADM

## 2022-05-20 RX ORDER — OXYTOCIN/RINGER'S LACTATE 30/500 ML
87.3 PLASTIC BAG, INJECTION (ML) INTRAVENOUS AS NEEDED
Status: DISCONTINUED | OUTPATIENT
Start: 2022-05-20 | End: 2022-05-22 | Stop reason: HOSPADM

## 2022-05-20 RX ORDER — FENTANYL/BUPIVACAINE/NS/PF 2-1250MCG
1-16 PREFILLED PUMP RESERVOIR EPIDURAL CONTINUOUS
Status: DISCONTINUED | OUTPATIENT
Start: 2022-05-20 | End: 2022-05-21 | Stop reason: HOSPADM

## 2022-05-20 RX ORDER — OXYTOCIN/RINGER'S LACTATE 30/500 ML
PLASTIC BAG, INJECTION (ML) INTRAVENOUS
Status: DISCONTINUED | OUTPATIENT
Start: 2022-05-20 | End: 2022-05-20 | Stop reason: HOSPADM

## 2022-05-20 RX ORDER — SODIUM CHLORIDE 9 MG/ML
150 INJECTION, SOLUTION INTRAVENOUS CONTINUOUS
Status: DISCONTINUED | OUTPATIENT
Start: 2022-05-20 | End: 2022-05-22 | Stop reason: HOSPADM

## 2022-05-20 RX ORDER — KETOROLAC TROMETHAMINE 30 MG/ML
30 INJECTION, SOLUTION INTRAMUSCULAR; INTRAVENOUS
Status: DISPENSED | OUTPATIENT
Start: 2022-05-20 | End: 2022-05-21

## 2022-05-20 RX ORDER — BUPIVACAINE HYDROCHLORIDE AND EPINEPHRINE 2.5; 5 MG/ML; UG/ML
INJECTION, SOLUTION EPIDURAL; INFILTRATION; INTRACAUDAL; PERINEURAL
Status: COMPLETED
Start: 2022-05-20 | End: 2022-05-20

## 2022-05-20 RX ORDER — OXYTOCIN/RINGER'S LACTATE 30/500 ML
PLASTIC BAG, INJECTION (ML) INTRAVENOUS
Status: COMPLETED
Start: 2022-05-20 | End: 2022-05-20

## 2022-05-20 RX ORDER — MORPHINE SULFATE 0.5 MG/ML
INJECTION, SOLUTION EPIDURAL; INTRATHECAL; INTRAVENOUS AS NEEDED
Status: DISCONTINUED | OUTPATIENT
Start: 2022-05-20 | End: 2022-05-20 | Stop reason: HOSPADM

## 2022-05-20 RX ORDER — ZOLPIDEM TARTRATE 5 MG/1
5 TABLET ORAL
Status: DISCONTINUED | OUTPATIENT
Start: 2022-05-20 | End: 2022-05-22 | Stop reason: HOSPADM

## 2022-05-20 RX ORDER — OXYTOCIN/RINGER'S LACTATE 30/500 ML
0-20 PLASTIC BAG, INJECTION (ML) INTRAVENOUS
Status: DISCONTINUED | OUTPATIENT
Start: 2022-05-20 | End: 2022-05-22 | Stop reason: HOSPADM

## 2022-05-20 RX ORDER — DIPHENHYDRAMINE HYDROCHLORIDE 50 MG/ML
12.5 INJECTION, SOLUTION INTRAMUSCULAR; INTRAVENOUS
Status: DISCONTINUED | OUTPATIENT
Start: 2022-05-20 | End: 2022-05-22 | Stop reason: HOSPADM

## 2022-05-20 RX ORDER — FENTANYL/BUPIVACAINE/NS/PF 2-1250MCG
PREFILLED PUMP RESERVOIR EPIDURAL
Status: COMPLETED
Start: 2022-05-20 | End: 2022-05-20

## 2022-05-20 RX ADMIN — Medication 909 ML/HR: at 17:30

## 2022-05-20 RX ADMIN — LIDOCAINE HYDROCHLORIDE,EPINEPHRINE BITARTRATE 5 ML: 20; .005 INJECTION, SOLUTION EPIDURAL; INFILTRATION; INTRACAUDAL; PERINEURAL at 16:50

## 2022-05-20 RX ADMIN — SODIUM CHLORIDE, POTASSIUM CHLORIDE, SODIUM LACTATE AND CALCIUM CHLORIDE 999 ML/HR: 600; 310; 30; 20 INJECTION, SOLUTION INTRAVENOUS at 09:13

## 2022-05-20 RX ADMIN — SODIUM CHLORIDE, POTASSIUM CHLORIDE, SODIUM LACTATE AND CALCIUM CHLORIDE 999 ML/HR: 600; 310; 30; 20 INJECTION, SOLUTION INTRAVENOUS at 16:02

## 2022-05-20 RX ADMIN — CEFAZOLIN SODIUM 2 G: 1 INJECTION, POWDER, FOR SOLUTION INTRAMUSCULAR; INTRAVENOUS at 16:59

## 2022-05-20 RX ADMIN — SODIUM CHLORIDE, POTASSIUM CHLORIDE, SODIUM LACTATE AND CALCIUM CHLORIDE 999 ML/HR: 600; 310; 30; 20 INJECTION, SOLUTION INTRAVENOUS at 08:14

## 2022-05-20 RX ADMIN — BUPIVACAINE HYDROCHLORIDE AND EPINEPHRINE 3 ML: 2.5; 5 INJECTION, SOLUTION EPIDURAL; INFILTRATION; INTRACAUDAL; PERINEURAL at 09:28

## 2022-05-20 RX ADMIN — OXYTOCIN 2 MILLI-UNITS/MIN: 10 INJECTION INTRAVENOUS at 08:32

## 2022-05-20 RX ADMIN — SODIUM CHLORIDE, PRESERVATIVE FREE 10 ML: 5 INJECTION INTRAVENOUS at 00:32

## 2022-05-20 RX ADMIN — SODIUM CHLORIDE, POTASSIUM CHLORIDE, SODIUM LACTATE AND CALCIUM CHLORIDE: 600; 310; 30; 20 INJECTION, SOLUTION INTRAVENOUS at 17:43

## 2022-05-20 RX ADMIN — LIDOCAINE HYDROCHLORIDE,EPINEPHRINE BITARTRATE 5 ML: 20; .005 INJECTION, SOLUTION EPIDURAL; INFILTRATION; INTRACAUDAL; PERINEURAL at 16:45

## 2022-05-20 RX ADMIN — Medication 12 ML/HR: at 09:50

## 2022-05-20 RX ADMIN — BUPIVACAINE HYDROCHLORIDE AND EPINEPHRINE 3 ML: 2.5; 5 INJECTION, SOLUTION EPIDURAL; INFILTRATION; INTRACAUDAL; PERINEURAL at 09:25

## 2022-05-20 RX ADMIN — MORPHINE SULFATE 5 MG: 0.5 INJECTION, SOLUTION EPIDURAL; INTRATHECAL; INTRAVENOUS at 17:34

## 2022-05-20 RX ADMIN — Medication 25 MCG: at 03:31

## 2022-05-20 RX ADMIN — BUPIVACAINE HYDROCHLORIDE AND EPINEPHRINE 0.5 ML: 2.5; 5 INJECTION, SOLUTION EPIDURAL; INFILTRATION; INTRACAUDAL; PERINEURAL at 09:25

## 2022-05-20 RX ADMIN — KETOROLAC TROMETHAMINE 30 MG: 30 INJECTION, SOLUTION INTRAMUSCULAR at 18:58

## 2022-05-20 RX ADMIN — LIDOCAINE HYDROCHLORIDE,EPINEPHRINE BITARTRATE 5 ML: 20; .005 INJECTION, SOLUTION EPIDURAL; INFILTRATION; INTRACAUDAL; PERINEURAL at 16:55

## 2022-05-20 RX ADMIN — DIPHENHYDRAMINE HYDROCHLORIDE 12.5 MG: 50 INJECTION, SOLUTION INTRAMUSCULAR; INTRAVENOUS at 21:57

## 2022-05-20 RX ADMIN — AZITHROMYCIN MONOHYDRATE 500 MG: 500 INJECTION, POWDER, LYOPHILIZED, FOR SOLUTION INTRAVENOUS at 16:49

## 2022-05-20 RX ADMIN — BUTORPHANOL TARTRATE 1 MG: 2 INJECTION, SOLUTION INTRAMUSCULAR; INTRAVENOUS at 00:30

## 2022-05-20 RX ADMIN — Medication 80 MCG: at 17:14

## 2022-05-20 RX ADMIN — ONDANSETRON HYDROCHLORIDE 4 MG: 2 INJECTION, SOLUTION INTRAMUSCULAR; INTRAVENOUS at 17:13

## 2022-05-20 NOTE — PROGRESS NOTES
Labor Progress:    Kaycee Miller is comfortable       Visit Vitals  BP (!) 104/52   Pulse 83   Temp 98.6 °F (37 °C)   Resp 16   SpO2 96%   Breastfeeding No     Cervix: 50/-3, AROM for clear fluid   [place]: rare   FHT: Cat 1      A:  Harley Blue is a 23 y.o.  at 39w3d   Elective IOL  GBS neg        P:  Pitocin

## 2022-05-20 NOTE — ANESTHESIA PROCEDURE NOTES
CSE Block    Start time: 5/20/2022 9:15 AM  End time: 5/20/2022 9:30 AM  Performed by: Do Pretty MD  Authorized by: Do Pretty MD     Pre-Procedure  Indications: primary anesthetic    preanesthetic checklist: patient identified, risks and benefits discussed, anesthesia consent, site marked, patient being monitored and timeout performed    Timeout Time: 09:14 EDT        Procedure:   Patient Position:  Seated  Prep Region:  Lumbar  Prep: DuraPrep    Location:  L2-3    Epidural Needle:   Needle Type:  Tuohy  Needle Gauge:  17 G  Injection Technique:  Loss of resistance using saline  Attempts:  1    Spinal Needle:   Needle Type:   Rupa  Needle Gauge:  25 G    Catheter:   Catheter Type:  Flex-tip  Catheter Size:  19 G  Catheter at Skin Depth (cm):  6  Depth in Epidural Space (cm):  4  Events: no blood with aspiration, no cerebrospinal fluid with aspiration, no paresthesia, negative aspiration test and CSF confirmed    Test Dose:  Negative    Assessment:   Catheter Secured:  Tegaderm and tape  Insertion:  Uncomplicated  Patient tolerance:  Patient tolerated the procedure well with no immediate complications

## 2022-05-20 NOTE — PROGRESS NOTES
.Labor Progress      Gabriella Cason is comfortable with her epidural      Visit Vitals  /65   Pulse 84   Temp 98 °F (36.7 °C)   Resp 16   SpO2 97%   Breastfeeding No     Cervix: 9/10/-2, cephalic IUPC placed without difficulty   [place]: not tracing well   FHT:acelerations present with good variability, rare variable decelerations     A:  IOL elective   GBS neg      P: position changes   Continue pitocin

## 2022-05-20 NOTE — ANESTHESIA PREPROCEDURE EVALUATION
Relevant Problems   RESPIRATORY SYSTEM   (+) Asthma, cough variant      NEUROLOGY   (+) Cyclothymia   (+) Posttraumatic stress disorder   (+) Social phobia       Anesthetic History   No history of anesthetic complications            Review of Systems / Medical History  Patient summary reviewed, nursing notes reviewed and pertinent labs reviewed    Pulmonary            Asthma   Pertinent negatives: No COPD and recent URI     Neuro/Psych         Psychiatric history  Pertinent negatives: No seizures and CVA   Cardiovascular  Within defined limits              Pertinent negatives: No hypertension, past MI and CHF  Exercise tolerance: >4 METS     GI/Hepatic/Renal     GERD        Pertinent negatives: No liver disease and renal disease   Endo/Other  Within defined limits        Pertinent negatives: No diabetes   Other Findings              Physical Exam    Airway  Mallampati: II  TM Distance: > 6 cm  Neck ROM: normal range of motion   Mouth opening: Normal     Cardiovascular  Regular rate and rhythm,  S1 and S2 normal,  no murmur, click, rub, or gallop  Rhythm: regular  Rate: normal         Dental  No notable dental hx       Pulmonary  Breath sounds clear to auscultation               Abdominal  GI exam deferred       Other Findings            Anesthetic Plan    ASA: 2  Anesthesia type: CSE      Post-op pain plan if not by surgeon: indwelling epidural catheter      Anesthetic plan and risks discussed with: Patient

## 2022-05-20 NOTE — ANESTHESIA POSTPROCEDURE EVALUATION
Procedure(s):   SECTION. CSE    Anesthesia Post Evaluation        Patient location during evaluation: PACU  Note status: Adequate. Level of consciousness: responsive to verbal stimuli and sleepy but conscious  Pain management: satisfactory to patient  Airway patency: patent  Anesthetic complications: no  Cardiovascular status: acceptable  Respiratory status: acceptable  Hydration status: acceptable  Comments: +Post-Anesthesia Evaluation and Assessment    Patient: Gem Liu MRN: [id number]  SSN: [id number]   YOB: 2000  Age: 23 y.o. Sex: female      Cardiovascular Function/Vital Signs    /87   Pulse 95   Temp 36.9 °C (98.5 °F)   Resp 16   Ht 5' 4\" (1.626 m)   Wt 88.9 kg (196 lb)   SpO2 100%   Breastfeeding No   BMI 33.64 kg/m²     Patient is status post Procedure(s):   SECTION. Nausea/Vomiting: Controlled. Postoperative hydration reviewed and adequate. Pain:  Pain Scale 1: Numeric (0 - 10) (22 0725)  Pain Intensity 1: 0 (22 0725)   Managed. Neurological Status:   Neuro (WDL): Within Defined Limits (22 0725)   At baseline. Mental Status and Level of Consciousness: Arousable. Pulmonary Status:   O2 Device: None (22 180)   Adequate oxygenation and airway patent. Complications related to anesthesia: None    Post-anesthesia assessment completed. No concerns.     Signed By: Alma Sosa MD    2022  Post anesthesia nausea and vomiting:  controlled      INITIAL Post-op Vital signs:   Vitals Value Taken Time   /87 22 1801   Temp     Pulse 95 22 1801   Resp 16 22 1801   SpO2 100 % 22 180

## 2022-05-20 NOTE — PROGRESS NOTES
1455:  Pt. Ambulated onto unit for scheduled indx. Pt. Reports postive fetal movement. Pt. Denies any HA, Visual disturbances, vaginal bleeding, LOF, or RUQ pain. 1510: Pt. Care turned over to ANAHY Cook RN.

## 2022-05-20 NOTE — PROGRESS NOTES
Nutrition: MST referral received. Patient is admitted for induction of labor. Nutrition assessment not appropriate at this time. RD available by consult if needs arise. Thanks.   Lucia Richardson RD

## 2022-05-20 NOTE — PROGRESS NOTES
Epimenio Laming is comfortable with her epidural.   Cervix 3-4/90 with band/-2 with significant caput   Amnioinfusion started for recurrent variables to the 70's with contractions. Position changes and amnio unable to resolve recurrent variables   Pitocin cut in half with continued deep variables   Discussed turning pitocin off completely and then reassessing   Discussed continuing IOL vs PCS   Discussed risks and benefits of both   Patient desires primary CS   We discussed in the detail the risks which include infection, bleeding, damage to surrounding structures, risk of VTE and implications on future pregnancies. Patient verbally voiced understanding and consents to a PCS.   PCS called for NRFHT remote from delivery    Ancef/Azithro  Anesthesia notified   OR made aware

## 2022-05-20 NOTE — PROGRESS NOTES
1800: Dr. Norman Bautista at bedside. Strip reviewed. SVE performed, 1/ /. CRB placed, 60/60.    1925: SBAR report given to BARAK Baldwin RN.  Care turned over at this time

## 2022-05-20 NOTE — PROGRESS NOTES
0710. Bedside shift change report given to MAUREEN Olson RN, SAGE Pace (oncoming nurse) by Lolis Guaman RN, BARAK Baldwin RN (offgoing nurse). Report included the following information SBAR, Kardex, Intake/Output, MAR and Recent Results. 9862.  in room, strip reviewed, cervical exam 2/50/-3, AROM clear fluid, plan of care discussed. 7513. Pitocin started. 1234-17: Anesthesia at bedside at 0914. Patient positioned to side of the bed, EFM & TOCO adjusted to accommodate sterile field. Difficulty tracing due to maternal position. Time out completed at 0914. Successful epidural is completed by Dr Anastacia Sheldon. Patient is repositioned supine in bed with wedge under left hip. EFM and TOCO replaced and blood pressure frequency increased. Hylton catheter placed and epidural continuous infusion is started. 1156. Dr. Vinay Peres in room, strip reviewed, IUPC placed, cervical exam 2/80/0  1516. Notified Dr. Vinay Peres of deep variables, orders given for amnioinfusion. 1519. Amnioinfusion started. 1604. Pt placed in hands and knees position, RN x2 at bedside   1611. Pt placed in left lateral.   1624. Dr. Vinay Peres in room, strip reviewed, discussed plan of care.   1625. C section called. 1728. LTCS of live baby girl by Dr. Vinay Peres at this time.   1800. Post partum care started. 1915. Bedside shift change report given to MAUREEN Lyle RN (oncoming nurse) by SAGE Weinstein RN, RN (offgoing nurse). Report included the following information SBAR, Kardex, Intake/Output, MAR and Recent Results.

## 2022-05-20 NOTE — BRIEF OP NOTE
Brief Postoperative Note    Patient: Gem Liu  YOB: 2000  MRN: [id number]    Date of Procedure: 2022     Pre-Op Diagnosis:   1.)  at 39w3d   2.) Elective IOL   3.) NRFHT's remote from delivery (recurrent variables decelerations)     Post-Op Diagnosis:   1.)  at 39w3d   2.) Elective IOL   3.) NRFHT's remote from delivery (recurrent variables decelerations)   4.) Liveborn female      Procedure(s):  PRIMARY LOW TRANSVERSE  SECTION    Surgeon(s):  MD Arti Carmona MD    Surgical Assistant: None    Anesthesia: Spinal     Estimated Blood Loss (mL): 535     Complications: None    Specimens: NONE    Implants: NONE     Drains: HARE TO GRAVITY     Findings: NORMAL APPEARING ADNEXA BILATERALLY ASIDE FROM SMALL RIGHT PARATUBAL CYST. NO UTERINE ANOMALIES. LIVEBORN FEMALE . NUCHAL CORD X1.      Electronically Signed by Agatha Ronquillo MD on 2022 at 5:57 PM

## 2022-05-21 LAB
BASOPHILS # BLD: 0.1 K/UL (ref 0–0.1)
BASOPHILS NFR BLD: 0 % (ref 0–1)
DIFFERENTIAL METHOD BLD: ABNORMAL
EOSINOPHIL # BLD: 0.2 K/UL (ref 0–0.4)
EOSINOPHIL NFR BLD: 1 % (ref 0–7)
ERYTHROCYTE [DISTWIDTH] IN BLOOD BY AUTOMATED COUNT: 13.5 % (ref 11.5–14.5)
HCT VFR BLD AUTO: 34.7 % (ref 35–47)
HGB BLD-MCNC: 11.1 G/DL (ref 11.5–16)
IMM GRANULOCYTES # BLD AUTO: 0.1 K/UL (ref 0–0.04)
IMM GRANULOCYTES NFR BLD AUTO: 1 % (ref 0–0.5)
LYMPHOCYTES # BLD: 2.2 K/UL (ref 0.8–3.5)
LYMPHOCYTES NFR BLD: 11 % (ref 12–49)
MCH RBC QN AUTO: 30.8 PG (ref 26–34)
MCHC RBC AUTO-ENTMCNC: 32 G/DL (ref 30–36.5)
MCV RBC AUTO: 96.4 FL (ref 80–99)
MONOCYTES # BLD: 1.8 K/UL (ref 0–1)
MONOCYTES NFR BLD: 9 % (ref 5–13)
NEUTS SEG # BLD: 15.9 K/UL (ref 1.8–8)
NEUTS SEG NFR BLD: 78 % (ref 32–75)
NRBC # BLD: 0 K/UL (ref 0–0.01)
NRBC BLD-RTO: 0 PER 100 WBC
PLATELET # BLD AUTO: 299 K/UL (ref 150–400)
PMV BLD AUTO: 10 FL (ref 8.9–12.9)
RBC # BLD AUTO: 3.6 M/UL (ref 3.8–5.2)
WBC # BLD AUTO: 20.2 K/UL (ref 3.6–11)

## 2022-05-21 PROCEDURE — 36415 COLL VENOUS BLD VENIPUNCTURE: CPT

## 2022-05-21 PROCEDURE — 74011250636 HC RX REV CODE- 250/636: Performed by: ANESTHESIOLOGY

## 2022-05-21 PROCEDURE — 65410000002 HC RM PRIVATE OB

## 2022-05-21 PROCEDURE — 74011000250 HC RX REV CODE- 250: Performed by: ANESTHESIOLOGY

## 2022-05-21 PROCEDURE — 74011250637 HC RX REV CODE- 250/637: Performed by: OBSTETRICS & GYNECOLOGY

## 2022-05-21 PROCEDURE — 85025 COMPLETE CBC W/AUTO DIFF WBC: CPT

## 2022-05-21 PROCEDURE — 74011250636 HC RX REV CODE- 250/636: Performed by: OBSTETRICS & GYNECOLOGY

## 2022-05-21 RX ADMIN — SODIUM CHLORIDE, PRESERVATIVE FREE 10 ML: 5 INJECTION INTRAVENOUS at 10:44

## 2022-05-21 RX ADMIN — IBUPROFEN 800 MG: 400 TABLET, FILM COATED ORAL at 10:43

## 2022-05-21 RX ADMIN — SODIUM CHLORIDE, POTASSIUM CHLORIDE, SODIUM LACTATE AND CALCIUM CHLORIDE 125 ML/HR: 600; 310; 30; 20 INJECTION, SOLUTION INTRAVENOUS at 02:23

## 2022-05-21 RX ADMIN — KETOROLAC TROMETHAMINE 30 MG: 30 INJECTION, SOLUTION INTRAMUSCULAR at 01:13

## 2022-05-21 RX ADMIN — IBUPROFEN 800 MG: 400 TABLET, FILM COATED ORAL at 17:48

## 2022-05-21 RX ADMIN — DOCUSATE SODIUM 100 MG: 100 CAPSULE, LIQUID FILLED ORAL at 10:43

## 2022-05-21 RX ADMIN — DIPHENHYDRAMINE HYDROCHLORIDE 12.5 MG: 50 INJECTION, SOLUTION INTRAMUSCULAR; INTRAVENOUS at 02:23

## 2022-05-21 RX ADMIN — ACETAMINOPHEN 1000 MG: 500 TABLET ORAL at 17:47

## 2022-05-21 RX ADMIN — ACETAMINOPHEN 1000 MG: 500 TABLET ORAL at 10:43

## 2022-05-21 NOTE — ROUTINE PROCESS
Bedside and Verbal shift change report given to ANAHY Cook RN (oncoming nurse) by Tiff Duenas RN (offgoing nurse). Report included the following information SBAR.

## 2022-05-21 NOTE — LACTATION NOTE
This note was copied from a baby's chart. 22 5789   Visit Information   Lactation Consult Visit Type IP Initial Consult   Visit Length 30 minutes   Reason for Visit Normal [place] Visit;Education   Breast- Feeding Assessment   Attends Breast-Feeding Classes No   Breast-Feeding Experience No  (Mother has a Medela breast pump; Measured for 24mm flanges)   Breast Assessment   Left Breast Large   Left Nipple Everted   Right Breast Large   Right Nipple Everted   Mother/Infant Observation   Mother Observation Close hold;Breast comfortable;Recognizes feeding cues   Infant Observation Breast tissue moves; Latches nipple and aereolae;Lips flanged, lower; Lips flanged, upper;Opens mouth   LATCH Documentation   Latch 2   Audible Swallowing 1   Type of Nipple 2   Comfort (Breast/Nipple) 2   Hold (Positioning) 2   LATCH Score 9     Discussed the typical feeding characteristics in the 1st and 2nd DOL and signs of adequate intake. Observed that mother was using good technique latching baby and baby was showing good signs of milk transfer. Discussed a feeding plan and mother was given the opportunity to ask questions. Plan:  Offer lots of skin to skin and access to the breast.  Feed baby at early signs of hunger every 2-3 hours. Pump/hand express for poor feeding and offer baby EBM. Monitor wet and dirty diapers for signs of adequate intake.

## 2022-05-21 NOTE — OP NOTES
[address]  OPERATIVE REPORT    Name:  Shoaib Chavez  MR#:  [id number]  :  2000  ACCOUNT #:  [de-identified]  DATE OF SERVICE:  2022    PREOPERATIVE DIAGNOSES:    1) G1, P0, at 39 weeks and 3 days  2) Nonreassuring fetal heart tones remote from delivery  3) Elective induction. POSTOPERATIVE DIAGNOSES:  G1, P0, at 39 weeks and 3 days, nonreassuring fetal heart tones remote from delivery, elective induction, liveborn female . OPERATION PERFORMED:  Primary low-transverse  section. SURGEON:  Nico Diaz MD    FIRST ASSIST:  Romelia Garvin MD    ANESTHESIA:  Spinal.    COMPLICATIONS:  None. SPECIMENS REMOVED:  None. IMPLANTS:  None. ESTIMATED BLOOD LOSS:  200 mL. DRAINS:  Hylton to gravity. FINDINGS:  Normal appearing adnexa bilaterally aside from a small right paratubal cyst.  No uterine anomaly. Liveborn female . Nuchal cord x1. OPERATION:  After informed consent was obtained, the patient was taken to the operating room and placed under satisfactory anesthesia. She was placed in dorsal lithotomy position with a left lateral tilt. She was prepped and draped in normal sterile fashion. Hylton catheter had been placed prior to the OR. After prepping and draping in normal sterile fashion, a time-out was performed to ensure correct patient and correct procedure. Pfannenstiel incision was made with scalpel approximately 2 cm above the pubic symphysis and carried through to the underlying layer of fascia. Fascia was nicked in the midline and incision was extended bilaterally with Moser's. Superficial aspect of fascial incision was elevated on either side of the midline with Kocher clamp and the rectus muscle was taken down with blunt and sharp dissection using Moser's. Same was done for the inferior aspect. Rectus muscle was identified and  in the midline. The peritoneum was identified and entered bluntly.  It was increased with gentle traction. Bladder blade was placed, vesicouterine peritoneum was identified, grasped with pickups, and entered sharply with Metzenbaum. This incision was extended laterally and bladder flap was created digitally. A low-transverse incision was made on the uterus. Baby found to be in OP presentation. Baby's head was elevated from the pelvis and delivered through the hysterotomy. Cord clamped and cut after baby was suctioned and dried. Placenta was removed with gentle traction and found to be intact. Oxytocin infusion was started. Uterus was exteriorized, wrapped with a wet lap and explored and cleared off all clots and debris. The uterus was sutured using 0-Vicryl in a running locked fashion and tied. One additional figure-of-eight suture was used on the left hand side of the hysterotomy to render hemostatic. This was performed with 0-Vicryl. The fascia was identified and closed with a #1 CTX of Vicryl. Subcutaneous tissue was closed with 2-0 Vicryl. Skin was closed with 4-0 Monocryl on a Ron needle. The patient tolerated the procedure well and returned to recovery room in stable condition. All sponge, needle, and instrument counts were correct at the end of the procedure per OR staff.       Art Triplett MD      [id number]  D:  05/20/2022 18:29  T:  05/20/2022 21:10  JOB #:  [id number]

## 2022-05-21 NOTE — PROGRESS NOTES
Post-Operative Day Number 1 Progress Note    Patient doing well post-op day 1 from  delivery without significant complaints. Pain controlled on current medication. Voiding without difficulty, normal lochia. Vitals:  Patient Vitals for the past 8 hrs:   BP Temp Pulse Resp   22 0119 (!) 105/58 99.1 °F (37.3 °C) (!) 109 16     Temp (24hrs), Av.3 °F (36.8 °C), Min:97.9 °F (36.6 °C), Max:99.1 °F (37.3 °C)      Vital signs stable, afebrile. Exam:  Patient without distress. Abdomen soft, fundus firm at level of umbilicus, non tender. Incision dry and clean without erythema. Lower extremities are negative for swelling, cords or tenderness. Lab/Data Review: All lab results for the last 24 hours reviewed. Assessment and Plan:  Patient appears to be having uncomplicated post- course. Continue routine post-op care and maternal education.

## 2022-05-22 VITALS
TEMPERATURE: 98.6 F | BODY MASS INDEX: 33.46 KG/M2 | RESPIRATION RATE: 18 BRPM | WEIGHT: 196 LBS | DIASTOLIC BLOOD PRESSURE: 55 MMHG | OXYGEN SATURATION: 98 % | SYSTOLIC BLOOD PRESSURE: 110 MMHG | HEART RATE: 70 BPM | HEIGHT: 64 IN

## 2022-05-22 PROCEDURE — 74011250637 HC RX REV CODE- 250/637: Performed by: OBSTETRICS & GYNECOLOGY

## 2022-05-22 RX ORDER — OXYCODONE HYDROCHLORIDE 5 MG/1
5 TABLET ORAL
Qty: 12 TABLET | Refills: 0 | Status: SHIPPED | OUTPATIENT
Start: 2022-05-22 | End: 2022-05-25

## 2022-05-22 RX ORDER — ACETAMINOPHEN 500 MG
1000 TABLET ORAL EVERY 8 HOURS
Qty: 30 TABLET | Refills: 0 | Status: SHIPPED | OUTPATIENT
Start: 2022-05-22

## 2022-05-22 RX ORDER — IBUPROFEN 800 MG/1
800 TABLET ORAL EVERY 8 HOURS
Qty: 30 TABLET | Refills: 0 | Status: SHIPPED | OUTPATIENT
Start: 2022-05-22

## 2022-05-22 RX ADMIN — IBUPROFEN 800 MG: 400 TABLET, FILM COATED ORAL at 12:05

## 2022-05-22 RX ADMIN — IBUPROFEN 800 MG: 400 TABLET, FILM COATED ORAL at 03:00

## 2022-05-22 RX ADMIN — ACETAMINOPHEN 1000 MG: 500 TABLET ORAL at 12:05

## 2022-05-22 RX ADMIN — ACETAMINOPHEN 1000 MG: 500 TABLET ORAL at 03:00

## 2022-05-22 NOTE — PROGRESS NOTES
Post-Operative  Day 2    [facility] for the patient's :  Earnest Dose [[id number]]   , Low Transverse    Patient doing well without significant complaint. Nausea and vomiting resolved, tolerating liquids, passing flatus, voiding and ambulating without difficulty. Breastfeeding. Pt desires early discharge    Vitals:    Visit Vitals  /66 (BP 1 Location: Right upper arm, BP Patient Position: Sitting)   Pulse 80   Temp 98.5 °F (36.9 °C)   Resp 18   Ht 5' 4\" (1.626 m)   Wt 88.9 kg (196 lb)   SpO2 97%   Breastfeeding Unknown   BMI 33.64 kg/m²     Temp (24hrs), Av.2 °F (36.8 °C), Min:97.7 °F (36.5 °C), Max:98.6 °F (37 °C)        Exam:        Patient without distress. Abdomen, bowel sounds present, soft, expected tenderness, fundus firm                Wound incision clean, dry and intact               Lower extremities are negative for swelling, cords or tenderness.    Breasts soft,  nontender    Labs:   Lab Results   Component Value Date/Time    WBC 20.2 (H) 2022 10:09 AM    WBC 13.4 (H) 2022 04:59 PM    HGB 11.1 (L) 2022 10:09 AM    HGB 12.4 2022 04:59 PM    HCT 34.7 (L) 2022 10:09 AM    HCT 38.5 2022 04:59 PM    PLATELET 221  10:09 AM    PLATELET 328  04:59 PM       Recent Results (from the past 24 hour(s))   CBC WITH AUTOMATED DIFF    Collection Time: 22 10:09 AM   Result Value Ref Range    WBC 20.2 (H) 3.6 - 11.0 K/uL    RBC 3.60 (L) 3.80 - 5.20 M/uL    HGB 11.1 (L) 11.5 - 16.0 g/dL    HCT 34.7 (L) 35.0 - 47.0 %    MCV 96.4 80.0 - 99.0 FL    MCH 30.8 26.0 - 34.0 PG    MCHC 32.0 30.0 - 36.5 g/dL    RDW 13.5 11.5 - 14.5 %    PLATELET 494 593 - 286 K/uL    MPV 10.0 8.9 - 12.9 FL    NRBC 0.0 0  WBC    ABSOLUTE NRBC 0.00 0.00 - 0.01 K/uL    NEUTROPHILS 78 (H) 32 - 75 %    LYMPHOCYTES 11 (L) 12 - 49 %    MONOCYTES 9 5 - 13 %    EOSINOPHILS 1 0 - 7 %    BASOPHILS 0 0 - 1 % IMMATURE GRANULOCYTES 1 (H) 0.0 - 0.5 %    ABS. NEUTROPHILS 15.9 (H) 1.8 - 8.0 K/UL    ABS. LYMPHOCYTES 2.2 0.8 - 3.5 K/UL    ABS. MONOCYTES 1.8 (H) 0.0 - 1.0 K/UL    ABS. EOSINOPHILS 0.2 0.0 - 0.4 K/UL    ABS. BASOPHILS 0.1 0.0 - 0.1 K/UL    ABS. IMM. GRANS. 0.1 (H) 0.00 - 0.04 K/UL    DF AUTOMATED         Assessment: Post-Op day 2, doing well      Plan:   1. Routine post-operative care  2. Possible early discharge depending on infant status  3. F/up in office this week for incision check, then 6 weeks for postpartum visit  4.   Continue tylenol, ibuprofen and roxicodone at home, as well as regular home medications

## 2022-05-22 NOTE — ROUTINE PROCESS
Patient discharge prescriptions & instructions given. Verbalized understanding. All questions answered. No distress noted. Signed copy of discharge instructions on paper chart. Will call to schedule 1 week OB follow up.

## 2022-05-22 NOTE — DISCHARGE INSTRUCTIONS
Patient Education         Section: What to Expect at Home  Your Recovery     A  section, or , is surgery to deliver your baby through a cut that the doctor makes in your lower belly and uterus. The cut is called an incision. You may have some pain in your lower belly and need pain medicine for 1 to 2 weeks. You can expect some vaginal bleeding for several weeks. You will probably need about 6 weeks to fully recover. It's important to take it easy while the incision heals. Avoid heavy lifting, strenuous activities, and exercises that strain the belly muscles while you recover. Ask a family member or friend for help with housework, cooking, and shopping. This care sheet gives you a general idea about how long it will take for you to recover. But each person recovers at a different pace. Follow the steps below to get better as quickly as possible. How can you care for yourself at home? Activity    · Rest when you feel tired. Getting enough sleep will help you recover.     · Try to walk each day. Start by walking a little more than you did the day before. Bit by bit, increase the amount you walk. Walking boosts blood flow and helps prevent pneumonia, constipation, and blood clots.     · Avoid strenuous activities, such as bicycle riding, jogging, weightlifting, and aerobic exercise, for 6 weeks or until your doctor says it is okay.     · Until your doctor says it is okay, do not lift anything heavier than your baby.     · Do not do sit-ups or other exercises that strain the belly muscles for 6 weeks or until your doctor says it is okay.     · Hold a pillow over your incision when you cough or take deep breaths. This will support your belly and decrease your pain.     · You may shower as usual. Pat the incision dry when you are done.     · You will have some vaginal bleeding. Wear sanitary pads.  Do not douche or use tampons until your doctor says it is okay.     · Ask your doctor when you can drive again.     · You will probably need to take at least 6 weeks off work. It depends on the type of work you do and how you feel.     · Ask your doctor when it is okay for you to have sex. Diet    · You can eat your normal diet. If your stomach is upset, try bland, low-fat foods like plain rice, broiled chicken, toast, and yogurt.     · Drink plenty of fluids (unless your doctor tells you not to).     · You may notice that your bowel movements are not regular right after your surgery. This is common. Try to avoid constipation and straining with bowel movements. You may want to take a fiber supplement every day. If you have not had a bowel movement after a couple of days, ask your doctor about taking a mild laxative.     · If you are breastfeeding, limit alcohol. Alcohol can cause a lack of energy and other health problems for the baby when a breastfeeding woman drinks heavily. It can also get in the way of a mom's ability to feed her baby or to care for the child in other ways. There isn't a lot of research about exactly how much alcohol can harm a baby. Having no alcohol is the safest choice for your baby. If you choose to have a drink now and then, have only one drink, and limit the number of occasions that you have a drink. Wait to breastfeed at least 2 hours after you have a drink to reduce the amount of alcohol the baby may get in the milk. Medicines    · Your doctor will tell you if and when you can restart your medicines. You will also get instructions about taking any new medicines.     · If you take aspirin or some other blood thinner, ask your doctor if and when to start taking it again. Make sure that you understand exactly what your doctor wants you to do.     · Take pain medicines exactly as directed. ? If the doctor gave you a prescription medicine for pain, take it as prescribed.   ? If you are not taking a prescription pain medicine, ask your doctor if you can take an over-the-counter medicine.     · If you think your pain medicine is making you sick to your stomach:  ? Take your medicine after meals (unless your doctor has told you not to). ? Ask your doctor for a different pain medicine.     · If your doctor prescribed antibiotics, take them as directed. Do not stop taking them just because you feel better. You need to take the full course of antibiotics. Incision care    · If you have strips of tape on the incision, leave the tape on for a week or until it falls off.     · Wash the area daily with warm, soapy water, and pat it dry. Don't use hydrogen peroxide or alcohol, which can slow healing. You may cover the area with a gauze bandage if it weeps or rubs against clothing. Change the bandage every day.     · Keep the area clean and dry. Other instructions    · If you breastfeed your baby, you may be more comfortable while you are healing if you don't rest your baby on your belly. Try tucking your baby under your arm, with your baby's body along the side you will be feeding on. Support your baby's upper body with your arm. With that hand you can control your baby's head to bring your baby's mouth to your breast. This is sometimes called the football hold. Follow-up care is a key part of your treatment and safety. Be sure to make and go to all appointments, and call your doctor if you are having problems. It's also a good idea to know your test results and keep a list of the medicines you take. When should you call for help? Share this information with your partner, family, or a friend. They can help you watch for warning signs. Call 911  anytime you think you may need emergency care. For example, call if:    · You have thoughts of harming yourself, your baby, or another person.     · You passed out (lost consciousness).     · You have chest pain, are short of breath, or cough up blood.     · You have a seizure.    Call your doctor now or seek immediate medical care if:    · You have loose stitches, or your incision comes open.     · You have signs of hemorrhage (too much bleeding), such as:  ? Heavy vaginal bleeding. This means that you are soaking through one or more pads in an hour. Or you pass blood clots bigger than an egg. ? Feeling dizzy or lightheaded, or you feel like you may faint. ? Feeling so tired or weak that you cannot do your usual activities. ? A fast or irregular heartbeat. ? New or worse belly pain.     · You have symptoms of infection, such as:  ? Increased pain, swelling, warmth, or redness. ? Red streaks leading from the incision. ? Pus draining from the incision. ? A fever. ? Vaginal discharge that smells bad.  ? New or worse belly pain.     · You have symptoms of a blood clot in your leg (called a deep vein thrombosis), such as:  ? Pain in your calf, back of the knee, thigh, or groin. ? Redness and swelling in your leg or groin.     · You have signs of preeclampsia, such as:  ? Sudden swelling of your face, hands, or feet. ? New vision problems (such as dimness, blurring, or seeing spots). ? A severe headache. Watch closely for changes in your health, and be sure to contact your doctor if:    · Your vaginal bleeding isn't decreasing.     · You feel sad, anxious, or hopeless for more than a few days.     · You are having problems with your breasts or breastfeeding. Where can you learn more? Go to http://www.[employer].com/  Enter M806 in the search box to learn more about \" Section: What to Expect at Home. \"  Current as of: 2021               Content Version: 13.2  © 5970-4900 [employer]. Care instructions adapted under license by [employer] (which disclaims liability or warranty for this information).  If you have questions about a medical condition or this instruction, always ask your healthcare professional. [place] [address] any warranty or liability for your use of this information. Patient Education        Learning About Starting to Breastfeed  Planning ahead     Before your baby is born, plan ahead. Learn all you can about breastfeeding. This helps make breastfeeding easier. · Early in your pregnancy, talk to your doctor or midwife about breastfeeding. · Learn the basics before your baby is born. The staff at hospitals and birthing centers can help you find a lactation specialist. This person is often a nurse who has been trained to teach and advise about breastfeeding. Or you can take a breastfeeding class. · Plan ahead for times when you will need help after your baby is born. You may want to get help from friends and family. You can also join a support group to talk to others who breastfeed. · Buy the equipment you'll need. Examples are breast pads, nipple cream, extra pillows, and nursing bras. Find out about breast pumps too. Getting help from your hospital or birthing center  It's important to have support from the doctors, nurses, and hospital staff who care for you and your baby. Before it's time for you to give birth, ask about the breastfeeding policies at your hospital or birthing center. Look for a hospital or birthing center that has policies for:  · \"Rooming in. \" This policy encourages you to have your baby in the room with you. It can allow you to breastfeed more often. · Supplemental feedings. Tell the staff that your baby is to get only your breast milk from birth. If staff feed your baby water, sugar solution, or formula right after birth without a medical reason, it may make it harder for you to breastfeed. · Pacifiers or artificial nipples. Staff should not give your  these items. They may interfere with breastfeeding. · Follow-up. Find out if your hospital can help you with breastfeeding issues after you go home. See if you can get information on support groups or other contacts.  They might help if you need help setting up and staying with your breastfeeding routine. Your first feeding  It's best to start breastfeeding within 1 hour of birth. For each feeding, you go through these basic steps:  · Get ready for the feeding. Be calm and relaxed, and try not to be distracted. Get some water or juice for yourself. Use two or three pillows to help support your baby while nursing. · Find a breastfeeding position that is comfortable for you and your baby. Examples are the cradle and the football positions. Make sure the baby's head and chest are lined up straight and facing your breast. It's best to switch which breast you start with each time. · Get the baby latched on well. Your baby's mouth needs to be wide open, like a yawn. So you may need to gently touch the middle of your baby's lower lip. When your baby's mouth is open wide, quickly bring the baby onto your nipple and areola. The areola is the dark [place] around your nipple. · Provide a complete feeding. Let your baby decide how long to nurse. Be sure to burp your baby after each breast.  In the first days after birth, your breasts make a thick, yellow liquid called colostrum. This liquid gives your baby nutrients and antibodies against infection. It is all that babies need at first. Your breasts will fill with milk a few days after the birth. Talk to your doctor, midwife, or lactation specialist right away if you are having problems and aren't sure what to do. How often to breastfeed  Plan to breastfeed your baby on demand rather than setting a strict schedule. For the first 2 weeks, be prepared to breastfeed at least 8 times in a 24-hour period. In the first few days, you may need to wake a sleeping baby to feed. If you breastfeed more often, it will help your breasts to produce more milk. After you go home  After you're home, don't be afraid to call your doctor, midwife, or lactation specialist with questions. That's true even if you don't know what's bothering you.  They are used to parents of newborns calling. They can help you figure out if there is a problem, and if so, how to fix it. Plan for times when you will be apart from your baby. Use a breast pump to collect breast milk ahead of time. You can store milk in the refrigerator or freezer. Then it's ready when someone else will be taking care of your baby. Experts recommend waiting about a month until breastfeeding is going well before offering a bottle. Breastfeeding is a learned skill that gets easier over time. You are more likely to succeed if you plan ahead, learn the basic techniques, and know where to get help and support. Where can you learn more? Go to http://www.gray.com/  Enter Q917 in the search box to learn more about \"Learning About Starting to Breastfeed. \"  Current as of: June 16, 2021               Content Version: 13.2  © 1928-7461 [employer]. Care instructions adapted under license by [employer] (which disclaims liability or warranty for this information). If you have questions about a medical condition or this instruction, always ask your healthcare professional. [address] any warranty or liability for your use of this information.

## 2022-05-22 NOTE — ROUTINE PROCESS
Bedside and Verbal shift change report given to LILO Henley RN (oncoming nurse) by Eboni Rosado RN (offgoing nurse). Report included the following information SBAR, Procedure Summary, Intake/Output and MAR.

## 2022-05-22 NOTE — DISCHARGE SUMMARY
Obstetrical Discharge Summary     Name: Bear Odom MRN: [id number]  SSN: [id number]    YOB: 2000  Age: 23 y.o. Sex: female      Allergies: Patient has no known allergies. Admit Date: 2022    Discharge Date: 2022     Admitting Physician: Mirna Angeles MD     Attending Physician:  Rosario Maldonado MD     * Admission Diagnoses: Pregnancy [Z34.90]    * Discharge Diagnoses:   Information for the patient's :  Rajwinder Winters [[id number]]   Delivery of a 2.825 kg female infant via , Low Transverse on 2022 at 5:28 PM  by Mirna Angeles. Apgars were 8  and 9 . Additional Diagnoses:   Hospital Problems as of 2022 Date Reviewed: 10/16/2019          Codes Class Noted - Resolved POA    Pregnancy ICD-10-CM: Z34.90  ICD-9-CM: V22.2  2022 - Present Unknown             Lab Results   Component Value Date/Time    ABO/Rh(D) A POSITIVE 2022 08:44 PM    Rubella, External Immune - 16.4 2021 12:00 AM    GrBStrep, External Negative 2022 12:00 AM    ABO,Rh A Positive 2021 12:00 AM    There is no immunization history for the selected administration types on file for this patient. * Procedures:   Procedure(s):   SECTION           * Discharge Condition: good and stable    * Hospital Course: Normal hospital course following the delivery. * Disposition: Home    Discharge Medications:   Current Discharge Medication List      START taking these medications    Details   acetaminophen (TYLENOL) 500 mg tablet Take 2 Tablets by mouth every eight (8) hours. Qty: 30 Tablet, Refills: 0  Start date: 2022      ibuprofen (MOTRIN) 800 mg tablet Take 1 Tablet by mouth every eight (8) hours. Qty: 30 Tablet, Refills: 0  Start date: 2022      oxyCODONE IR (ROXICODONE) 5 mg immediate release tablet Take 1 Tablet by mouth every four (4) hours as needed for Pain for up to 3 days.  Max Daily Amount: 30 mg.  Qty: 12 Tablet, Refills: 0  Start date: 5/22/2022, End date: 5/25/2022    Associated Diagnoses: Post-op pain         CONTINUE these medications which have NOT CHANGED    Details   PNV Comb #2-Iron-FA-Omega 3 29-1-400 mg cmpk Take  by mouth. beclomethasone (QVAR) 40 mcg/actuation aero Take 2 Puffs by inhalation. albuterol (VENTOLIN HFA) 90 mcg/actuation inhaler Take 2 Puffs by inhalation. montelukast (SINGULAIR) 10 mg tablet Take 10 mg by mouth. STOP taking these medications       lamoTRIgine (LaMICtal) 100 mg tablet Comments:   Reason for Stopping:         tazorotene (TAZORAC) 0.05 % topical cream Comments:   Reason for Stopping:         etonogestrel (NEXPLANON) 68 mg impl Comments:   Reason for Stopping:               * Follow-up Care/Patient Instructions:   Activity: Activity as tolerated, Activity as tolerated and no driving for today, No sex for 6 weeks, No driving while on analgesics and No heavy lifting for 6 weeks  Diet: Regular Diet  Wound Care: Keep wound clean and dry    Follow-up Information     Follow up With Specialties Details Why Vira Reed MD Gynecology, Obstetrics, Obstetrics & Gynecology Schedule an appointment as soon as possible for a visit in 1 week this week for incision check [address]  [phone]

## 2022-05-25 ENCOUNTER — TELEPHONE (OUTPATIENT)
Dept: MOTHER INFANT UNIT | Age: 22
End: 2022-05-25

## 2022-05-25 NOTE — TELEPHONE ENCOUNTER
Mother states that breastfeeding is going well. Baby is 11days old today and having yellow stools. Mother has a breast pump. Discussed the supply and demand concept of breast milk production. Mother may pump her breasts and offer EBM baby has a poor feeding. Mother was given the opportunity to ask questions.

## 2022-05-27 ENCOUNTER — HOSPITAL ENCOUNTER (EMERGENCY)
Age: 22
Discharge: HOME | End: 2022-05-27
Attending: EMERGENCY MEDICINE
Payer: COMMERCIAL

## 2022-05-27 ENCOUNTER — APPOINTMENT (OUTPATIENT)
Dept: ULTRASOUND IMAGING | Age: 22
End: 2022-05-27
Attending: PHYSICIAN ASSISTANT
Payer: COMMERCIAL

## 2022-05-27 VITALS
HEIGHT: 64 IN | BODY MASS INDEX: 32.44 KG/M2 | TEMPERATURE: 97.7 F | SYSTOLIC BLOOD PRESSURE: 132 MMHG | DIASTOLIC BLOOD PRESSURE: 83 MMHG | OXYGEN SATURATION: 98 % | HEART RATE: 77 BPM | WEIGHT: 190 LBS | RESPIRATION RATE: 16 BRPM

## 2022-05-27 DIAGNOSIS — R60.0 PEDAL EDEMA: Primary | ICD-10-CM

## 2022-05-27 DIAGNOSIS — Z98.891 S/P C-SECTION: ICD-10-CM

## 2022-05-27 PROCEDURE — 93971 EXTREMITY STUDY: CPT

## 2022-05-27 PROCEDURE — 99284 EMERGENCY DEPT VISIT MOD MDM: CPT

## 2022-05-27 NOTE — ED PROVIDER NOTES
EMERGENCY DEPARTMENT HISTORY AND PHYSICAL EXAM      Date: 2022  Patient Name: Elmo Guy    History of Presenting Illness     Chief Complaint   Patient presents with    Leg Swelling     Pt arrives ambulatory to triage with report of R calf swelling today. Patient is 1 week post op from a C section. History Provided By: Patient, partner at bedside    HPI: Elmo Guy, 23 y.o. female with PMHx of asthma, 1 week status post  presents BIB self to the ED with concern for DVT. Earlier today the patient noticed her right foot and ankle were swollen. She denies known trauma or injury. She called her OB who referred her to the ED the for DVT rule out. She saw her OB yesterday for her routine postop appointment and was told everything looked good. She denies history of blood clots, family history of blood clots, smoking. Pregnancy was uncomplicated. Currently breast-feeding. Here in the ED, she states that the edema appears to be improving. There are no other complaints, changes, or physical findings at this time. PCP: Estela Ledezma MD    No current facility-administered medications on file prior to encounter. Current Outpatient Medications on File Prior to Encounter   Medication Sig Dispense Refill    acetaminophen (TYLENOL) 500 mg tablet Take 2 Tablets by mouth every eight (8) hours. 30 Tablet 0    ibuprofen (MOTRIN) 800 mg tablet Take 1 Tablet by mouth every eight (8) hours. 30 Tablet 0    PNV Comb #2-Iron-FA-Omega 3 29-1-400 mg cmpk Take  by mouth.  albuterol (VENTOLIN HFA) 90 mcg/actuation inhaler Take 2 Puffs by inhalation.  beclomethasone (QVAR) 40 mcg/actuation aero Take 2 Puffs by inhalation.  montelukast (SINGULAIR) 10 mg tablet Take 10 mg by mouth.  (Patient not taking: Reported on 2022)         Past History     Past Medical History:  Past Medical History:   Diagnosis Date    Asthma     Hx of multiple concussions     while playing soccer    Hx of streptococcal infection     7 X last year    Psychiatric problem     Bipolar. Anxiety, Depression       Past Surgical History:  Past Surgical History:   Procedure Laterality Date    HX KNEE ARTHROSCOPY      HX OTHER SURGICAL      Ligament reconstruction in left elbow    HX OTHER SURGICAL      Cyst removal of left ankle    HX TONSILLECTOMY  01/2018       Family History:  Family History   Problem Relation Age of Onset    Other Father         incarcerated for drugs and conspiracy to murder       Social History:  Social History     Tobacco Use    Smoking status: Never Smoker    Smokeless tobacco: Never Used   Substance Use Topics    Alcohol use: Never    Drug use: Never       Allergies:  No Known Allergies      Review of Systems   Review of Systems   Respiratory: Negative for shortness of breath. Cardiovascular: Negative for chest pain. Genitourinary:        Postpartum   Musculoskeletal:        RLE edema       Physical Exam   Physical Exam  Vitals and nursing note reviewed. Constitutional:       General: She is not in acute distress. Appearance: Normal appearance. She is well-developed. HENT:      Head: Normocephalic and atraumatic. Eyes:      General: Lids are normal.      Extraocular Movements: Extraocular movements intact. Conjunctiva/sclera: Conjunctivae normal.   Cardiovascular:      Comments: 2+ DP pulses b/l  Pulmonary:      Effort: Pulmonary effort is normal.   Musculoskeletal:         General: Normal range of motion. Cervical back: Normal range of motion and neck supple. Comments: Mild edema of the right foot and ankle. No appreciable calf numbness, edema, erythema, or palpable cords. Skin:     General: Skin is warm and dry. Neurological:      General: No focal deficit present. Mental Status: She is alert and oriented to person, place, and time.    Psychiatric:         Mood and Affect: Mood normal.         Behavior: Behavior normal. Behavior is cooperative. Diagnostic Study Results     Labs -   No results found for this or any previous visit (from the past 12 hour(s)). Radiologic Studies -   DUPLEX LOWER EXT VENOUS RIGHT   Final Result        CT Results  (Last 48 hours)    None        CXR Results  (Last 48 hours)    None            Medical Decision Making   I am the first provider for this patient. I reviewed the vital signs, available nursing notes, past medical history, past surgical history, family history and social history. Vital Signs-Reviewed the patient's vital signs. Patient Vitals for the past 12 hrs:   Temp Pulse Resp BP SpO2   22 1807 97.7 °F (36.5 °C) 77 16 132/83 98 %       Records Reviewed: Nursing Notes and Old Medical Records    Provider Notes (Medical Decision Making):   Doppler negative for DVT. Advised on leg elevation, oral hydration. Can try compression stockings if symptoms recur. Follow-up with OB. ED Course:   Initial assessment performed. The patients presenting problems have been discussed, and they are in agreement with the care plan formulated and outlined with them. I have encouraged them to ask questions as they arise throughout their visit. Case discussed with attending, Dr. Saima Kraft. Critical Care Time: None    Disposition:  Discharge    PLAN:  1. Current Discharge Medication List        2. Follow-up Information     Follow up With Specialties Details Why Contact Info    [facility] EMERGENCY DEPT Emergency Medicine  As needed, If symptoms worsen [address] [facility] [address]    Stone Heard MD Gynecology, Obstetrics, Obstetrics & Gynecology Call  For follow up Sherie Gonzales [phone]          Return to ED if worse     Diagnosis     Clinical Impression:   1. Pedal edema    2. S/P           Please note that this dictation was completed with [employer], the computer voice recognition software.  Quite often unanticipated grammatical, syntax, homophones, and other interpretive errors are inadvertently transcribed by the computer software. Please disregards these errors. Please excuse any errors that have escaped final proofreading.

## 2022-05-27 NOTE — ED NOTES
200 - Patient discharged by [facility] - pt sent to the front lobby, with strong and steady gait, no acute distress noted at time of discharge -  Discharge information / home RX / and reasons to return to the ED were reviewed by the ED provider.       Pt's spouse at bedside for comfort, care and for a ride home;

## 2022-10-20 LAB
ANTIBODY SCREEN, EXTERNAL: NEGATIVE
HCT, EXTERNAL: 43.7
HGB, EXTERNAL: 14.2
HIV, EXTERNAL: NEGATIVE
NIPT, EXTERNAL: NEGATIVE
PLATELET CNT,   EXTERNAL: 316
RPR, EXTERNAL: 13.1

## 2022-11-05 ENCOUNTER — HOSPITAL ENCOUNTER (EMERGENCY)
Age: 22
Discharge: HOME | End: 2022-11-06
Attending: EMERGENCY MEDICINE
Payer: COMMERCIAL

## 2022-11-05 DIAGNOSIS — Z34.92 SECOND TRIMESTER PREGNANCY: ICD-10-CM

## 2022-11-05 DIAGNOSIS — G43.909 MIGRAINE WITHOUT STATUS MIGRAINOSUS, NOT INTRACTABLE, UNSPECIFIED MIGRAINE TYPE: Primary | ICD-10-CM

## 2022-11-05 PROCEDURE — 36415 COLL VENOUS BLD VENIPUNCTURE: CPT

## 2022-11-05 PROCEDURE — 85025 COMPLETE CBC W/AUTO DIFF WBC: CPT

## 2022-11-05 PROCEDURE — 80048 BASIC METABOLIC PNL TOTAL CA: CPT

## 2022-11-06 VITALS
SYSTOLIC BLOOD PRESSURE: 105 MMHG | WEIGHT: 189.38 LBS | TEMPERATURE: 97.9 F | OXYGEN SATURATION: 95 % | DIASTOLIC BLOOD PRESSURE: 57 MMHG | RESPIRATION RATE: 18 BRPM | HEIGHT: 64 IN | HEART RATE: 100 BPM | BODY MASS INDEX: 32.33 KG/M2

## 2022-11-06 LAB
ANION GAP SERPL CALC-SCNC: 6 MMOL/L (ref 5–15)
BASOPHILS # BLD: 0 K/UL (ref 0–0.1)
BASOPHILS NFR BLD: 0 % (ref 0–1)
BUN SERPL-MCNC: 7 MG/DL (ref 6–20)
BUN/CREAT SERPL: 11 (ref 12–20)
CALCIUM SERPL-MCNC: 8.8 MG/DL (ref 8.5–10.1)
CHLORIDE SERPL-SCNC: 108 MMOL/L (ref 97–108)
CO2 SERPL-SCNC: 22 MMOL/L (ref 21–32)
CREAT SERPL-MCNC: 0.62 MG/DL (ref 0.55–1.02)
DIFFERENTIAL METHOD BLD: ABNORMAL
EOSINOPHIL # BLD: 0.3 K/UL (ref 0–0.4)
EOSINOPHIL NFR BLD: 3 % (ref 0–7)
ERYTHROCYTE [DISTWIDTH] IN BLOOD BY AUTOMATED COUNT: 12.5 % (ref 11.5–14.5)
GLUCOSE SERPL-MCNC: 104 MG/DL (ref 65–100)
HCT VFR BLD AUTO: 38 % (ref 35–47)
HGB BLD-MCNC: 13 G/DL (ref 11.5–16)
IMM GRANULOCYTES # BLD AUTO: 0 K/UL (ref 0–0.04)
IMM GRANULOCYTES NFR BLD AUTO: 1 % (ref 0–0.5)
LYMPHOCYTES # BLD: 2.7 K/UL (ref 0.8–3.5)
LYMPHOCYTES NFR BLD: 30 % (ref 12–49)
MCH RBC QN AUTO: 32.3 PG (ref 26–34)
MCHC RBC AUTO-ENTMCNC: 34.2 G/DL (ref 30–36.5)
MCV RBC AUTO: 94.5 FL (ref 80–99)
MONOCYTES # BLD: 0.8 K/UL (ref 0–1)
MONOCYTES NFR BLD: 9 % (ref 5–13)
NEUTS SEG # BLD: 5 K/UL (ref 1.8–8)
NEUTS SEG NFR BLD: 57 % (ref 32–75)
NRBC # BLD: 0 K/UL (ref 0–0.01)
NRBC BLD-RTO: 0 PER 100 WBC
PLATELET # BLD AUTO: 268 K/UL (ref 150–400)
PMV BLD AUTO: 9.5 FL (ref 8.9–12.9)
POTASSIUM SERPL-SCNC: 3.8 MMOL/L (ref 3.5–5.1)
RBC # BLD AUTO: 4.02 M/UL (ref 3.8–5.2)
SODIUM SERPL-SCNC: 136 MMOL/L (ref 136–145)
WBC # BLD AUTO: 8.9 K/UL (ref 3.6–11)

## 2022-11-06 PROCEDURE — 74011250636 HC RX REV CODE- 250/636: Performed by: EMERGENCY MEDICINE

## 2022-11-06 PROCEDURE — 99284 EMERGENCY DEPT VISIT MOD MDM: CPT

## 2022-11-06 PROCEDURE — 74011250637 HC RX REV CODE- 250/637: Performed by: EMERGENCY MEDICINE

## 2022-11-06 PROCEDURE — 96375 TX/PRO/DX INJ NEW DRUG ADDON: CPT

## 2022-11-06 PROCEDURE — 96374 THER/PROPH/DIAG INJ IV PUSH: CPT

## 2022-11-06 RX ORDER — METOCLOPRAMIDE HYDROCHLORIDE 5 MG/ML
10 INJECTION INTRAMUSCULAR; INTRAVENOUS
Status: COMPLETED | OUTPATIENT
Start: 2022-11-06 | End: 2022-11-06

## 2022-11-06 RX ORDER — ACETAMINOPHEN 500 MG
1000 TABLET ORAL
Status: COMPLETED | OUTPATIENT
Start: 2022-11-06 | End: 2022-11-06

## 2022-11-06 RX ORDER — ONDANSETRON 2 MG/ML
4 INJECTION INTRAMUSCULAR; INTRAVENOUS
Status: COMPLETED | OUTPATIENT
Start: 2022-11-06 | End: 2022-11-06

## 2022-11-06 RX ORDER — DIPHENHYDRAMINE HYDROCHLORIDE 50 MG/ML
25 INJECTION, SOLUTION INTRAMUSCULAR; INTRAVENOUS
Status: COMPLETED | OUTPATIENT
Start: 2022-11-06 | End: 2022-11-06

## 2022-11-06 RX ADMIN — ACETAMINOPHEN 1000 MG: 500 TABLET ORAL at 01:06

## 2022-11-06 RX ADMIN — METOCLOPRAMIDE 10 MG: 5 INJECTION, SOLUTION INTRAMUSCULAR; INTRAVENOUS at 01:06

## 2022-11-06 RX ADMIN — ONDANSETRON 4 MG: 2 INJECTION INTRAMUSCULAR; INTRAVENOUS at 01:06

## 2022-11-06 RX ADMIN — DIPHENHYDRAMINE HYDROCHLORIDE 25 MG: 50 INJECTION, SOLUTION INTRAMUSCULAR; INTRAVENOUS at 01:06

## 2022-11-06 RX ADMIN — SODIUM CHLORIDE 1000 ML: 9 INJECTION, SOLUTION INTRAVENOUS at 01:04

## 2022-11-06 NOTE — ED PROVIDER NOTES
EMERGENCY DEPARTMENT HISTORY AND PHYSICAL EXAM       Please note that this dictation was completed with [email or web address], the computer voice recognition software. Quite often unanticipated grammatical, syntax, homophones, and other interpretive errors are inadvertently transcribed by the computer software. Please disregard these errors. Please excuse any errors that have escaped final proofreading. Date: 2022  Patient Name: Loulou Armendariz  Patient Age and Sex: 23 y.o. female    History of Presenting Illness     Chief Complaint   Patient presents with    Vomiting     Migraine all day. Has taken Fioricet 3x. Has also been nauseous. Pain 10/10 with no relief. History Provided By: Patient     Chief Complaint: headache    HPI: Loulou Armendariz, is a 23 y.o. female A1 at 15.5 weeks who presents to the ED with a throbbing generalized headache that is associated with photophobia and nausea with one episode of vomiting. Started this morning. Gradual onset and worsening over 2-3 hours. Reports history of migraines during her last pregnancy that were similar to what she has now. No associated hand or foot swelling, dizziness, focal neuro deficits, fever, chills or neck pain. Recently was seen at urgent care for the same, prescribed fioricet which she took today but it did not provide her any relief. No abd pain, vaginal bleeding or discharge. No urinary sytmpoms. Pt denies any other alleviating or exacerbating factors. No other associated signs or symptoms. There are no other complaints, changes or physical findings at this time. PCP: Ramesh Sun MD    Past History   All documented elements of the  [address] reviewed and verified by me. -Gilberto Harman MD    Past Medical History:  Past Medical History:   Diagnosis Date    Asthma     Hx of multiple concussions     while playing soccer    Hx of streptococcal infection     7 X last year    Psychiatric problem     Bipolar.  Anxiety, Depression       Past Surgical History:  Past Surgical History:   Procedure Laterality Date    HX KNEE ARTHROSCOPY      HX OTHER SURGICAL      Ligament reconstruction in left elbow    HX OTHER SURGICAL      Cyst removal of left ankle    HX TONSILLECTOMY  01/2018       Family History:   Family History   Problem Relation Age of Onset    Other Father         incarcerated for drugs and conspiracy to murder       Social History:  Social History     Tobacco Use    Smoking status: Never    Smokeless tobacco: Never   Substance Use Topics    Alcohol use: Never    Drug use: Never       Current Medications:  No current facility-administered medications on file prior to encounter. Current Outpatient Medications on File Prior to Encounter   Medication Sig Dispense Refill    acetaminophen (TYLENOL) 500 mg tablet Take 2 Tablets by mouth every eight (8) hours. 30 Tablet 0    ibuprofen (MOTRIN) 800 mg tablet Take 1 Tablet by mouth every eight (8) hours. 30 Tablet 0    PNV Comb #2-Iron-FA-Omega 3 29-1-400 mg cmpk Take  by mouth. albuterol (VENTOLIN HFA) 90 mcg/actuation inhaler Take 2 Puffs by inhalation. beclomethasone (QVAR) 40 mcg/actuation aero Take 2 Puffs by inhalation. montelukast (SINGULAIR) 10 mg tablet Take 10 mg by mouth. (Patient not taking: Reported on 5/2/2022)         Allergies:  No Known Allergies    Review of Systems   All other systems reviewed and negative    Review of Systems   Constitutional:  Negative for chills and fever. HENT: Negative. Eyes:  Positive for photophobia. Respiratory:  Negative for cough and shortness of breath. Cardiovascular:  Negative for chest pain, palpitations and leg swelling. Gastrointestinal:  Positive for nausea and vomiting. Negative for abdominal pain and diarrhea. Endocrine: Negative. Genitourinary:  Negative for dysuria and hematuria. Musculoskeletal:  Negative for joint swelling. Skin:  Negative for rash. Neurological:  Positive for headaches.  Negative for seizures, weakness and numbness. Psychiatric/Behavioral: Negative. Negative for confusion. All other systems reviewed and are negative. Physical Exam   Reviewed patients vital signs and nursing note    Physical Exam  Vitals and nursing note reviewed. Constitutional:       Appearance: She is not diaphoretic. HENT:      Head: Atraumatic. Nose: Nose normal.      Mouth/Throat:      Mouth: Mucous membranes are moist.   Eyes:      Extraocular Movements: Extraocular movements intact. Conjunctiva/sclera: Conjunctivae normal.      Pupils: Pupils are equal, round, and reactive to light. Cardiovascular:      Rate and Rhythm: Normal rate and regular rhythm. Pulses: Normal pulses. Heart sounds: Normal heart sounds. Pulmonary:      Effort: Pulmonary effort is normal.      Breath sounds: Normal breath sounds. Abdominal:      General: Bowel sounds are normal.      Palpations: Abdomen is soft. Tenderness: There is no abdominal tenderness. There is no right CVA tenderness or left CVA tenderness. Musculoskeletal:         General: No tenderness. Normal range of motion. Cervical back: Normal range of motion. No rigidity. Right lower leg: No edema. Left lower leg: No edema. Skin:     General: Skin is warm and dry. Capillary Refill: Capillary refill takes less than 2 seconds. Neurological:      General: No focal deficit present. Mental Status: She is alert and oriented to person, place, and time. Psychiatric:         Mood and Affect: Mood normal.         Behavior: Behavior normal.       Diagnostic Study Results     Labs - I have personally reviewed and interpreted all laboratory results. Interpretation of available and pertinent results detailed below in MDM.  Shwetha Xiao MD, MSc  Recent Results (from the past 24 hour(s))   CBC WITH AUTOMATED DIFF    Collection Time: 11/05/22 11:51 PM   Result Value Ref Range    WBC 8.9 3.6 - 11.0 K/uL    RBC 4.02 3.80 - 5.20 M/uL    HGB 13.0 11.5 - 16.0 g/dL    HCT 38.0 35.0 - 47.0 %    MCV 94.5 80.0 - 99.0 FL    MCH 32.3 26.0 - 34.0 PG    MCHC 34.2 30.0 - 36.5 g/dL    RDW 12.5 11.5 - 14.5 %    PLATELET 308 181 - 141 K/uL    MPV 9.5 8.9 - 12.9 FL    NRBC 0.0 0  WBC    ABSOLUTE NRBC 0.00 0.00 - 0.01 K/uL    NEUTROPHILS 57 32 - 75 %    LYMPHOCYTES 30 12 - 49 %    MONOCYTES 9 5 - 13 %    EOSINOPHILS 3 0 - 7 %    BASOPHILS 0 0 - 1 %    IMMATURE GRANULOCYTES 1 (H) 0.0 - 0.5 %    ABS. NEUTROPHILS 5.0 1.8 - 8.0 K/UL    ABS. LYMPHOCYTES 2.7 0.8 - 3.5 K/UL    ABS. MONOCYTES 0.8 0.0 - 1.0 K/UL    ABS. EOSINOPHILS 0.3 0.0 - 0.4 K/UL    ABS. BASOPHILS 0.0 0.0 - 0.1 K/UL    ABS. IMM. GRANS. 0.0 0.00 - 0.04 K/UL    DF AUTOMATED     METABOLIC PANEL, BASIC    Collection Time: 11/05/22 11:51 PM   Result Value Ref Range    Sodium 136 136 - 145 mmol/L    Potassium 3.8 3.5 - 5.1 mmol/L    Chloride 108 97 - 108 mmol/L    CO2 22 21 - 32 mmol/L    Anion gap 6 5 - 15 mmol/L    Glucose 104 (H) 65 - 100 mg/dL    BUN 7 6 - 20 MG/DL    Creatinine 0.62 0.55 - 1.02 MG/DL    BUN/Creatinine ratio 11 (L) 12 - 20      eGFR >60 >60 ml/min/1.73m2    Calcium 8.8 8.5 - 10.1 MG/DL       Radiologic Studies - I have personally reviewed and interpreted (see MDM for brief interpreation of available results) all imaging studies and agree with radiology interpretation and report. - Morleia Kelley MD, MSc  No orders to display         Medical Decision Making   I am the first provider for this patient. Records Reviewed:   I reviewed our electronic medical record system for any past medical records that were available that may contribute to the patient's current condition, including their PMH, surgical history, social and family history. This includes most recent ED visits, any available discharge summaries and prior ECGs, which I have reviewed and interpreted personally. I have summarized most salient findings in my HPI and MDM.   Morelia Kelley MD, MSc    I also reviewed the nursing notes and vital signs from today's visit. Nursing notes will be reviewed as they become available in realtime while the pt has been in the ED. Julia Bonilla MD, MSc      Vital Signs-Reviewed the patient's vital signs. Patient Vitals for the past 24 hrs:   Temp Pulse Resp BP SpO2   11/05/22 2348 97.9 °F (36.6 °C) 100 18 132/64 100 %         Provider Notes and Medical Decision Making: This patient presents with a headache, most likely migraine versus tension type headache. Similar to what she had during last pregnancy. No history of pre-eclampsia/eclampsia. Neurologic exam is without evidence of meningismus and there are no focal neurologic deficits. In addition, presentation is not consistent with an acute intracranial bleed or SAH (lack of risk factors, headache history). Similarly, presentation and exam are not concerning for an acute CNS infection such as encephalitis, meningitis or brain abscess. Temporal arteritis unlikely, as is acute angle closure glaucoma given history and physical findings. Low concern for other acute, emergent causes of headache at this time. Plan to treat symptomatically, monitor closely and reassess. No indication for imaging/LP at this time. Given pregnancy status tx options somewhat limited. Will start by giving her fluids, tylenol, reglan, benadryl. Then reassess. If better, I anticipate that she can be discharged. She has follow up with ob scheduled for Tuesday. No specific pregnancy concerns but fetal doppler done at beside with normal fetal heart tones, rate 145    ED Course: The patients presenting problems have been discussed, and they are in agreement with the care plan formulated and outlined with them. I have encouraged them to ask questions as they arise throughout their visit. Progress note:  Patient has been reassessed and reports feeling considerably better, has normal vital signs and feels comfortable going home.  I think this is reasonable as no findings today suggest a life-threatening condition. DISPOSITION: DISCHARGE  The patient's results have been reviewed with patient and available family and/or caregiver. They verbally convey their understanding and agreement of the patient's signs, symptoms, diagnosis, treatment and prognosis and additionally agree to follow up as recommended in the discharge instructions or to return to the Emergency Department should the patient's condition change prior to their follow-up appointment. The patient and available family and/or caregiver verbally agree with the care plan and all of their questions have been answered. The discharge instructions have also been provided to the them with educational information regarding the patient's diagnosis as well a list of reasons why the patient would want to return to the ER prior to their follow-up appointment should any concerns arise, the patient's condition change or symptoms worsen. Lora Coyne MD, Msc    PLAN:  Discharge Medication List as of 11/6/2022  2:26 AM        2. Follow-up Information       Follow up With Specialties Details Why Contact Info    your obgyn  Go to  your scheduled appointment on Tuesday           3. Return to ED if worse       I, Devonte Okeefe MD, am the attending of record for this patient encounter. Diagnosis     Final Clinical Impression:   1. Migraine without status migrainosus, not intractable, unspecified migraine type    2. Second trimester pregnancy        Attestation:  I personally performed the services described in this documentation on this date 11/5/2022 for patient Latanya Griffin.   Lora Coyne MD

## 2022-11-06 NOTE — DISCHARGE INSTRUCTIONS
It was a pleasure taking care of you in our Emergency Department today. We know that when you come to [facility], you are entrusting us with your health, comfort, and safety. Our physicians and nurses honor that trust, and truly appreciate the opportunity to care for you and your loved ones. We also value your feedback. If you receive a survey about your Emergency Department experience today, please fill it out. We care about our patients' feedback, and we listen to what you have to say.   Thank you!       --- Dr. Kalpesh Vizcaino MD

## 2022-11-06 NOTE — ED NOTES
Pt reports headache and nausea are completely gone, fetal heart tones obtained-see flowsheet, Dr Concepcion Sos updated.

## 2022-11-06 NOTE — ED NOTES
Discussed discharge instructions with pt-state understanding. Pt remains AOX4, PWD VSS, ambulatory with steady gait to waiting room.

## 2022-11-08 ENCOUNTER — INITIAL PRENATAL (OUTPATIENT)
Dept: OBGYN CLINIC | Age: 22
End: 2022-11-08

## 2022-11-08 VITALS
DIASTOLIC BLOOD PRESSURE: 76 MMHG | WEIGHT: 189 LBS | SYSTOLIC BLOOD PRESSURE: 118 MMHG | HEIGHT: 64 IN | BODY MASS INDEX: 32.27 KG/M2

## 2022-11-08 DIAGNOSIS — Z23 ENCOUNTER FOR IMMUNIZATION: ICD-10-CM

## 2022-11-08 DIAGNOSIS — Z34.90 PREGNANCY, UNSPECIFIED GESTATIONAL AGE: Primary | ICD-10-CM

## 2022-11-08 DIAGNOSIS — Z36.1 SCREENING FOR RAISED ALPHA-FETOPROTEIN LEVELS IN AMNIOTIC FLUID: ICD-10-CM

## 2022-11-08 PROCEDURE — 90686 IIV4 VACC NO PRSV 0.5 ML IM: CPT | Performed by: OBSTETRICS & GYNECOLOGY

## 2022-11-08 PROCEDURE — 0500F INITIAL PRENATAL CARE VISIT: CPT | Performed by: OBSTETRICS & GYNECOLOGY

## 2022-11-08 PROCEDURE — 90471 IMMUNIZATION ADMIN: CPT | Performed by: OBSTETRICS & GYNECOLOGY

## 2022-11-08 RX ORDER — BUTALBITAL, ACETAMINOPHEN AND CAFFEINE 50; 325; 40 MG/1; MG/1; MG/1
1 TABLET ORAL
COMMUNITY
Start: 2022-10-27

## 2022-11-08 NOTE — PROGRESS NOTES
Current pregnancy history:    Alana Patton is a  23 y.o. female BLACK/ Patient's last menstrual period was 2022. .    She presents for the evaluation of pregnancy. Transfer patient Evaristo Wolfe. LMP history:  The date of her LMP is 2022 . Her last menstrual period was normal and her LMP is certain. Ultrasound data:  She had an ultrasound performed today in the office which revealed a viable dias pregnancy with a gestational age of 12W 6D giving an [facility] of 2023. TA ULTRASOUND PERFORMED  A SINGLE VIABLE 15W6D WITH INOCENCIO OF 2023 IUP IS SEEN WITH NORMAL CARDIAC RHYTHM. GESTATIONAL AGE BASED ON TODAYS ULTRASOUND.  LIMITED ANATOMY WAS VISUALIZED AND APPEARS WNL. THERE APPEARS TO BE A POSTERIOR PLACENTA - WNL  BILATERAL OVARIES NOT VISUALIZED DUE TO GESTATIONAL AGE. BILATERAL ADNEXA APPEAR WNL. Pregnancy symptoms:  Since her LMP she has experienced migraines. She denies dysuria, abnormal vaginal discharge, vaginal bleeding, contractions or cramping. She reports good fetal movement. Past pregnancy history:   A1   2021 SAB w/ twins   2022 C/S    _ _ _ _ _ _ _ _ _ _ _ _ _ _ _ _ _ _ _ _ _ _ _ _ _ _ _ _ _ _ _ _     Substance history: negative for alcohol, tobacco and street drugs. Positive for nothing. Exposure history: There is/are no indoor cat/s in the home. The patient was instructed to not change the cat litter. She admits close contact with children on a regular basis. She has had chicken pox or the vaccine in the past.   Patient denies issues with domestic violence. Genetic Screening/Teratology Counseling: (Includes patient, baby's father, or anyone in either family with:)  3.  Patient's age >/= 28 at EDC?--no  2.   Thalassemia ([place], [place], [address], or  background): MCV<80?--no.     3.  Neural tube defect (meningomyelocele, spina bifida, anencephaly)?--no.   4.  Congenital heart defect?--no.  5.  Down syndrome?--no.   6.  Fan-Sachs ([place], [place])?--no.   7.  Canavan's Disease?--no.   8.  Familial Dysautonomia?--no.   9.  Sickle cell disease or trait ()? --no   The patient has not been tested for sickle trait  10. Hemophilia or other blood disorders?--no. 11.  Muscular dystrophy?--no. 12.  Cystic fibrosis?--no. 13.  Gallatin's Chorea?--no. 14.  Mental retardation/autism (if yes was person tested for Fragile X)?--no. 15.  Other inherited genetic or chromosomal disorder?--no. 12.  Maternal metabolic disorder (DM, PKU, etc)?--no. 17.  Patient or FOB with a child with a birth defect not listed above?--no.  17a. Patient or FOB with a birth defect themselves?--no. 18.  Recurrent pregnancy loss, or stillbirth?--no. 19.  Any medications since LMP other than prenatal vitamins (include vitamins, supplements, OTC meds, drugs, alcohol)?--no. 20.  Any other genetic/environmental exposure to discuss?--no. Infection History:  1. Lives with someone with TB or TB exposed?--no.   2.  Patient or partner has history of genital herpes?--no.  3.  Rash or viral illness since LMP?--no.    4.  History of STD (GC, CT, HPV, syphilis, HIV)? --no   5. Other: OTHER? Past Medical History:   Diagnosis Date    Asthma     Hx of multiple concussions     while playing soccer    Hx of streptococcal infection     7 X last year    Psychiatric problem     Bipolar.  Anxiety, Depression     Past Surgical History:   Procedure Laterality Date    HX KNEE ARTHROSCOPY      HX OTHER SURGICAL      Ligament reconstruction in left elbow    HX OTHER SURGICAL      Cyst removal of left ankle    HX TONSILLECTOMY  01/2018     Social History     Occupational History    Not on file   Tobacco Use    Smoking status: Never    Smokeless tobacco: Never   Substance and Sexual Activity    Alcohol use: Never    Drug use: Never    Sexual activity: Yes     Partners: Male     Birth control/protection: Implant     Family History   Problem Relation Age of Onset    Other Father         incarcerated for drugs and conspiracy to murder     OB History    Para Term  AB Living   4 1 1   1 1   SAB IAB Ectopic Molar Multiple Live Births   1       1 1      # Outcome Date GA Lbr Ulises/2nd Weight Sex Delivery Anes PTL Lv   4 Current            3 Term 22 39w3d  6 lb 3.7 oz (2.825 kg) F CS-LTranv CSE N LUC      Complications: Failure to Progress in First Stage, Fetal Intolerance   2A SAB 2021           2B SAB 2021           1               No Known Allergies  Prior to Admission medications    Medication Sig Start Date End Date Taking? Authorizing Provider   butalbital-acetaminophen-caffeine (FIORICET, ESGIC) -40 mg per tablet Take 1 Tablet by mouth every eight (8) hours as needed. 10/27/22  Yes Provider, Historical   PNV Comb #2-Iron-FA-Omega 3 29-1-400 mg cmpk Take  by mouth. Yes Provider, Historical   albuterol (PROVENTIL HFA, VENTOLIN HFA, PROAIR HFA) 90 mcg/actuation inhaler Take 2 Puffs by inhalation. 17  Yes Provider, Historical   acetaminophen (TYLENOL) 500 mg tablet Take 2 Tablets by mouth every eight (8) hours. Patient not taking: Reported on 2022   Jacinda Bermudez CNM   ibuprofen (MOTRIN) 800 mg tablet Take 1 Tablet by mouth every eight (8) hours. Patient not taking: Reported on 2022   Jacinda Bermudez CNM   beclomethasone (QVAR) 40 mcg/actuation aero Take 2 Puffs by inhalation. Patient not taking: Reported on 2022   Provider, Historical   montelukast (SINGULAIR) 10 mg tablet Take 10 mg by mouth.   Patient not taking: No sig reported 17   Provider, Historical        Review of Systems: History obtained from the patient  Constitutional: negative for weight loss, fever, night sweats  HEENT: negative for hearing loss, earache, congestion, snoring, sore throat  CV: negative for chest pain, palpitations, edema  Resp: negative for cough, shortness of breath, wheezing  Breast: negative for breast lumps, nipple discharge, galactorrhea  GI: negative for change in bowel habits, abdominal pain, black or bloody stools  : negative for frequency, dysuria, hematuria, vaginal discharge  MSK: negative for back pain, joint pain, muscle pain  Skin: negative for itching, rash, hives  Neuro: negative for dizziness, headache, confusion, weakness  Psych: negative for anxiety, depression, change in mood  Heme/lymph: negative for bleeding, bruising, pallor    Objective:  Visit Vitals  /76   Ht 5' 4\" (1.626 m)   Wt 189 lb (85.7 kg)   LMP 2022   BMI 32.44 kg/m²       Physical Exam:     Constitutional  Appearance: well-nourished, well developed, alert, in no acute distress    HENT  Head  Face: appears normal  Eyes: appear normal  Ears: normal  Mouth: normal  Lips: no lesions      Chest  Respiratory Effort: breathing unlabored     Cardiovascular  Heart:   Auscultation: regular rate and rhythm without murmur      Gastrointestinal  Abdominal Examination: abdomen non-tender to palpation, normal bowel sounds, no masses present  Liver and spleen: no hepatomegaly present, spleen not palpable  Hernias: no hernias identified    Genitourinary  VULVA: normal appearing vulva with no masses, tenderness or lesions   VAGINA: normal appearing vagina with normal color and discharge, no lesions   CERVIX: normal appearing cervix without discharge or lesions  UTERUS: enlarged to 16 week's size, nontender  ADNEXA: normal adnexa in size, nontender and no masses    Skin  General Inspection: no rash, no lesions identified    Neurologic/Psychiatric  Mental Status:  Orientation: grossly oriented to person, place and time  Mood and Affect: mood normal, affect appropriate      Assessment and Plan:   Kalyi Bermudez is a 23 y.o.  at 14w8d   New OB  Prior CS x1  Short interval pregnancy (daughter-Petroleum- 2 months old when she conceived)   A positive   Mild intermittent asthma   Headaches, intermittent  -taking Fioricet as needed    Intrauterine pregnancy with issues addressed in problem list.  We discussed genetic testing screening options for the pregnancy and offered NIPT and the patient desires (obtained at [facility]). We discussed carrier screening as per ACOG guidelines for CF, SMA, DMD, and Fragile X syndrome and the patient declines carrier screening. Course of pregnancy discussed including visit schedule, routine U/S, glucola testing, etc.  Avoid alcoholic beverages and illicit/recreational drugs use. Take prenatal vitamins or folic acid daily. Hospital and practice style discussed with coverage system. Discussed nutrition, toxoplasmosis precautions, sexual activity, exercise, need for influenza vaccine, environmental and work hazards, travel advice, screen for domestic violence, need for seat belts. Discussed seafood, unpasteurized dairy products, deli meat, artificial sweeteners, and caffeine. Discussed current prescription drug use. Given medication list.  Discussed the use of over the counter medications and chemicals. Route of delivery discussed, including risks, benefits  Handouts given to pt. Patient will return to office   Follow-up and Dispositions    Return in about 4 weeks (around 12/6/2022) for for MARY and anatomy US. Jessi Harrell MD, [address] [facility]

## 2022-11-10 LAB
AFP INTERP SERPL-IMP: NORMAL
AFP INTERP SERPL-IMP: NORMAL
AFP MOM SERPL: 1.2
AFP SERPL-MCNC: 35.2 NG/ML
AGE AT DELIVERY: 25.2 YR
COMMENT, 018013: NORMAL
GA METHOD: NORMAL
GA: 15 WEEKS
IDDM PATIENT QL: NO
MULTIPLE PREGNANCY: NO
NEURAL TUBE DEFECT RISK FETUS: NORMAL %
RESULTS, 017004: NORMAL

## 2022-11-11 LAB
C TRACH RRNA CVX QL NAA+PROBE: NEGATIVE
CYTOLOGIST CVX/VAG CYTO: NORMAL
CYTOLOGY CVX/VAG DOC CYTO: NORMAL
CYTOLOGY CVX/VAG DOC THIN PREP: NORMAL
DX ICD CODE: NORMAL
LABCORP, 190119: NORMAL
Lab: NORMAL
N GONORRHOEA RRNA CVX QL NAA+PROBE: NEGATIVE
OTHER STN SPEC: NORMAL
STAT OF ADQ CVX/VAG CYTO-IMP: NORMAL
T VAGINALIS RRNA SPEC QL NAA+PROBE: NEGATIVE

## 2022-11-18 ENCOUNTER — TELEPHONE (OUTPATIENT)
Dept: OBGYN CLINIC | Age: 22
End: 2022-11-18

## 2022-11-18 NOTE — TELEPHONE ENCOUNTER
Pt called name and  verified. Pt is  17 week 2 days gestation pt went to see her pcp and was prescribed amoxicillin for her sinus infection the pcp did not know what she could take for yeast infection that she get after taking antibiotics. Pt advised she can take monistat 7.       Please advise any further recommendations thank you

## 2022-12-06 ENCOUNTER — ROUTINE PRENATAL (OUTPATIENT)
Dept: OBGYN CLINIC | Age: 22
End: 2022-12-06

## 2022-12-06 VITALS — BODY MASS INDEX: 32.96 KG/M2 | WEIGHT: 192 LBS | SYSTOLIC BLOOD PRESSURE: 120 MMHG | DIASTOLIC BLOOD PRESSURE: 68 MMHG

## 2022-12-06 DIAGNOSIS — Z34.90 PREGNANCY, UNSPECIFIED GESTATIONAL AGE: ICD-10-CM

## 2022-12-06 RX ORDER — ALBUTEROL SULFATE 90 UG/1
2 AEROSOL, METERED RESPIRATORY (INHALATION)
Qty: 18 G | Refills: 5 | Status: SHIPPED | OUTPATIENT
Start: 2022-12-06

## 2022-12-06 NOTE — PROGRESS NOTES
Patient here for return OB visit. She reports no concerns today. She reports good fetal movement. She denies vaginal bleeding, loss of fluid, abnormal vaginal discharge, UTI symptoms, cramping, or contractions. Needs refill on inhaler. Recently sick. FETAL SURVEY  A SINGLE VIABLE IUP AT 19W6D IS SEEN. FETAL CARDIAC MOTION OBSERVED. FETAL ANATOMY WAS WELL VISUALIZED AND APPEARS WNL. NO ABNORMALITIES WERE SEEN ON TODAYS EXAM.  APPROPRIATE GROWTH MEASURED. SIZE = DATES. HUNG, PLACENTA AND CERVIX APPEAR WITHIN NORMAL LIMITS. GENDER: FEMALE      Visit Vitals  /68   Wt 192 lb (87.1 kg)   BMI 32.96 kg/m²        Jessi Blake MD

## 2022-12-15 ENCOUNTER — TELEPHONE (OUTPATIENT)
Dept: OBGYN CLINIC | Age: 22
End: 2022-12-15

## 2022-12-15 NOTE — TELEPHONE ENCOUNTER
Pt calling name and  verified. Pt is ss pt , pt is 21 weeks I day gestation pt is calling asking her pcp prescribed her a duo neb medication for her asthma she has a virus and it is causing her asthma to act up. Pt asking for clarification. Spoke with  any asthma medication ok to take during pregnancy ok to take duo neb.pt advised.  Pt verbalized understanding

## 2023-01-03 ENCOUNTER — ROUTINE PRENATAL (OUTPATIENT)
Dept: OBGYN CLINIC | Age: 23
End: 2023-01-03
Payer: COMMERCIAL

## 2023-01-03 VITALS — DIASTOLIC BLOOD PRESSURE: 64 MMHG | SYSTOLIC BLOOD PRESSURE: 120 MMHG | WEIGHT: 194.8 LBS | BODY MASS INDEX: 33.44 KG/M2

## 2023-01-03 DIAGNOSIS — Z34.90 PREGNANCY, UNSPECIFIED GESTATIONAL AGE: ICD-10-CM

## 2023-01-03 PROCEDURE — 0502F SUBSEQUENT PRENATAL CARE: CPT | Performed by: OBSTETRICS & GYNECOLOGY

## 2023-01-03 NOTE — PROGRESS NOTES
Patient here for return OB visit. She reports lower back pain and itching around scar from C/S. Discussed seeing chiropractor for low back pain in pregnancy. No tenderness over CS scar. She reports good fetal movement. She denies vaginal bleeding, loss of fluid, abnormal vaginal discharge, UTI symptoms, cramping, or contractions. Visit Vitals  /64   Wt 194 lb 12.8 oz (88.4 kg)   BMI 33.44 kg/m²        Jessi Cowan MD

## 2023-01-24 ENCOUNTER — ROUTINE PRENATAL (OUTPATIENT)
Dept: OBGYN CLINIC | Age: 23
End: 2023-01-24
Payer: COMMERCIAL

## 2023-01-24 VITALS — BODY MASS INDEX: 33.4 KG/M2 | WEIGHT: 194.6 LBS | SYSTOLIC BLOOD PRESSURE: 110 MMHG | DIASTOLIC BLOOD PRESSURE: 58 MMHG

## 2023-01-24 DIAGNOSIS — Z36.9 UNSPECIFIED ANTENATAL SCREENING: Primary | ICD-10-CM

## 2023-01-24 DIAGNOSIS — Z34.90 PREGNANCY, UNSPECIFIED GESTATIONAL AGE: ICD-10-CM

## 2023-01-24 PROCEDURE — 0502F SUBSEQUENT PRENATAL CARE: CPT | Performed by: OBSTETRICS & GYNECOLOGY

## 2023-01-24 RX ORDER — FAMOTIDINE 20 MG/1
20 TABLET, FILM COATED ORAL ONCE
Qty: 1 TABLET | Refills: 0 | Status: SHIPPED | OUTPATIENT
Start: 2023-01-24 | End: 2023-01-24

## 2023-01-24 NOTE — PROGRESS NOTES
Patient here for return OB visit. She reports no concerns. Acid reflux. Discussed RBIA/SE/Pos comp   She reports good fetal movement. She denies vaginal bleeding, loss of fluid, abnormal vaginal discharge, UTI symptoms, cramping, or contractions. Visit Vitals  BP (!) 110/58   Wt 194 lb 9.6 oz (88.3 kg)   BMI 33.40 kg/m²        Jessi Agrawal MD

## 2023-01-25 LAB
BASOPHILS # BLD: 0 K/UL (ref 0–0.1)
BASOPHILS NFR BLD: 0 % (ref 0–1)
DIFFERENTIAL METHOD BLD: ABNORMAL
EOSINOPHIL # BLD: 0.3 K/UL (ref 0–0.4)
EOSINOPHIL NFR BLD: 3 % (ref 0–7)
ERYTHROCYTE [DISTWIDTH] IN BLOOD BY AUTOMATED COUNT: 12.7 % (ref 11.5–14.5)
GLUCOSE 1H P 100 G GLC PO SERPL-MCNC: 83 MG/DL (ref 65–140)
HCT VFR BLD AUTO: 39.5 % (ref 35–47)
HGB BLD-MCNC: 12 G/DL (ref 11.5–16)
IMM GRANULOCYTES # BLD AUTO: 0.1 K/UL (ref 0–0.04)
IMM GRANULOCYTES NFR BLD AUTO: 1 % (ref 0–0.5)
LYMPHOCYTES # BLD: 2.1 K/UL (ref 0.8–3.5)
LYMPHOCYTES NFR BLD: 20 % (ref 12–49)
MCH RBC QN AUTO: 30.5 PG (ref 26–34)
MCHC RBC AUTO-ENTMCNC: 30.4 G/DL (ref 30–36.5)
MCV RBC AUTO: 100.3 FL (ref 80–99)
MONOCYTES # BLD: 1 K/UL (ref 0–1)
MONOCYTES NFR BLD: 9 % (ref 5–13)
NEUTS SEG # BLD: 7 K/UL (ref 1.8–8)
NEUTS SEG NFR BLD: 67 % (ref 32–75)
NRBC # BLD: 0 K/UL (ref 0–0.01)
NRBC BLD-RTO: 0 PER 100 WBC
PLATELET # BLD AUTO: 296 K/UL (ref 150–400)
PMV BLD AUTO: 10.6 FL (ref 8.9–12.9)
RBC # BLD AUTO: 3.94 M/UL (ref 3.8–5.2)
WBC # BLD AUTO: 10.5 K/UL (ref 3.6–11)

## 2023-02-02 ENCOUNTER — TELEPHONE (OUTPATIENT)
Dept: OBGYN CLINIC | Age: 23
End: 2023-02-02

## 2023-02-02 NOTE — TELEPHONE ENCOUNTER
Pt calling name and  verified. 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 Ok to write letter and place in her my chart?       Please advise thank you

## 2023-02-14 ENCOUNTER — ROUTINE PRENATAL (OUTPATIENT)
Dept: OBGYN CLINIC | Age: 23
End: 2023-02-14

## 2023-02-14 VITALS — WEIGHT: 194.6 LBS | BODY MASS INDEX: 33.4 KG/M2 | SYSTOLIC BLOOD PRESSURE: 130 MMHG | DIASTOLIC BLOOD PRESSURE: 58 MMHG

## 2023-02-14 DIAGNOSIS — Z34.90 PREGNANCY, UNSPECIFIED GESTATIONAL AGE: ICD-10-CM

## 2023-02-14 NOTE — PROGRESS NOTES
Patient here for return OB visit. She reports no concerns  She reports good fetal movement. She denies vaginal bleeding, loss of fluid, abnormal vaginal discharge, UTI symptoms, cramping, or contractions. LIMITED OB SCAN  A SINGLE VERTEX 29W6D IUP IS SEEN. FETAL CARDIAC MOTION OBSERVED. LIMITED ANATOMY WAS VISUALIZED AND APPEARS WNL. EFW= 3 LB 6 OZ ( 56.6 %)  HUNG= 18.61 CM  PLACENTA APPEAR WITHIN NORMAL LIMITS    Visit Vitals  BP (!) 130/58   Wt 194 lb 9.6 oz (88.3 kg)   BMI 33.40 kg/m²        Jessi Beltran MD

## 2023-02-22 ENCOUNTER — TELEPHONE (OUTPATIENT)
Dept: OBGYN CLINIC | Age: 23
End: 2023-02-22

## 2023-02-22 ENCOUNTER — ROUTINE PRENATAL (OUTPATIENT)
Dept: OBGYN CLINIC | Age: 23
End: 2023-02-22
Payer: COMMERCIAL

## 2023-02-22 VITALS — SYSTOLIC BLOOD PRESSURE: 136 MMHG | WEIGHT: 194 LBS | DIASTOLIC BLOOD PRESSURE: 70 MMHG | BODY MASS INDEX: 33.3 KG/M2

## 2023-02-22 DIAGNOSIS — R10.9 ABDOMINAL CRAMPING AFFECTING PREGNANCY: Primary | ICD-10-CM

## 2023-02-22 DIAGNOSIS — O26.899 ABDOMINAL CRAMPING AFFECTING PREGNANCY: Primary | ICD-10-CM

## 2023-02-22 NOTE — PROGRESS NOTES
OB Problem Visit    Shelbie Olivo is a 20 y.o.  presenting for an OB problem visit. Her main concern today is constant tightness in abdomen, and lower abdominal cramping that started this morning. Pt states she took tylenol and drank some water without relief. No VB or LOF. Good fetal movement. Pregnancy has been complicated by short interval pregnancy. Past Medical History:   Diagnosis Date    Asthma     Hx of multiple concussions     while playing soccer    Hx of streptococcal infection     7 X last year    Psychiatric problem     Bipolar.  Anxiety, Depression       Past Surgical History:   Procedure Laterality Date    HX KNEE ARTHROSCOPY      HX OTHER SURGICAL      Ligament reconstruction in left elbow    HX OTHER SURGICAL      Cyst removal of left ankle    HX TONSILLECTOMY  01/2018       Family History   Problem Relation Age of Onset    Other Father         incarcerated for drugs and conspiracy to murder    Breast Cancer Paternal Grandmother        Social History     Socioeconomic History    Marital status: SINGLE     Spouse name: Not on file    Number of children: Not on file    Years of education: Not on file    Highest education level: Not on file   Occupational History    Not on file   Tobacco Use    Smoking status: Never    Smokeless tobacco: Never   Substance and Sexual Activity    Alcohol use: Never    Drug use: Never    Sexual activity: Yes     Partners: Male     Birth control/protection: None   Other Topics Concern     Service Not Asked    Blood Transfusions Not Asked    Caffeine Concern Not Asked    Occupational Exposure Not Asked    Hobby Hazards Not Asked    Sleep Concern Not Asked    Stress Concern Not Asked    Weight Concern Not Asked    Special Diet Not Asked    Back Care Not Asked    Exercise Not Asked    Bike Helmet Not Asked    Seat Belt Not Asked    Self-Exams Not Asked   Social History Narrative    Not on file     Social Determinants of Health     Financial Resource Strain: Not on file   Food Insecurity: Not on file   Transportation Needs: Not on file   Physical Activity: Not on file   Stress: Not on file   Social Connections: Not on file   Intimate Partner Violence: Not on file   Housing Stability: Not on file       Current Outpatient Medications   Medication Sig Dispense Refill    albuterol (PROVENTIL HFA, VENTOLIN HFA, PROAIR HFA) 90 mcg/actuation inhaler Take 2 Puffs by inhalation every four (4) hours as needed for Wheezing. 18 g 5    butalbital-acetaminophen-caffeine (FIORICET, ESGIC) -40 mg per tablet Take 1 Tablet by mouth every eight (8) hours as needed. PNV Comb #2-Iron-FA-Omega 3 29-1-400 mg cmpk Take  by mouth.          No Known Allergies    Review of Systems - History obtained from the patient  Constitutional: negative for weight loss, fever, night sweats  HEENT: negative for hearing loss, earache, congestion, snoring, sorethroat  CV: negative for chest pain, palpitations, edema  Resp: negative for cough, shortness of breath, wheezing  GI: negative for change in bowel habits, abdominal pain, black or bloody stools  : negative for frequency, dysuria, hematuria, vaginal discharge  MSK: negative for back pain, joint pain, muscle pain  Breast: negative for breast lumps, nipple discharge, galactorrhea  Skin :negative for itching, rash, hives  Neuro: negative for dizziness, headache, confusion, weakness  Psych: negative for anxiety, depression, change in mood  Heme/lymph: negative for bleeding, bruising, pallor    Physical Exam    Visit Vitals  /70   Wt 194 lb (88 kg)   LMP 06/30/2022   BMI 33.30 kg/m²         OBGyn Exam      Constitutional  Appearance: well-nourished, well developed, alert, in no acute distress    Gastrointestinal  Abdominal Examination: abdomen non-distended, non-tender to palpation      Genitourinary  Cervix: closed/thick/high    Neurologic/Psychiatric  Mental Status:  Orientation: grossly oriented to person, place and time  Mood and Affect: mood normal, affect appropriate      Assessment/Plan:  Nadia Alfred is a 20 y.o.  at 31w0d   1. Abdominal cramping affecting pregnancy  -[place] with some uterine irritability. No contractions identified  -Cervix closed on digital exam  -No abnormal vaginal discharge  -Urine dip negative for evidence of UTI. Patient does appear slightly dehydrated based on specific gravity. Patient will return to office in 1 week or sooner if indicated. Discussed strict return precautions. All questions answered. Jessi Villafuerte MD

## 2023-02-22 NOTE — TELEPHONE ENCOUNTER
Patient calling name and  verified. Pt is  31 weeks. Pt c/o abdominal pain under bellybutton that started at 7 am she has taken tylenol at 8am no relief rating 6-7 out of 10. She states it is a constant cramping. Baby is moving no vaginal discharge or bleeding.     Pt scheduled for appointment at 1:30 offered 11 with Dr. Sree Barrow she lived to far away to get here by then

## 2023-02-28 ENCOUNTER — ROUTINE PRENATAL (OUTPATIENT)
Dept: OBGYN CLINIC | Age: 23
End: 2023-02-28
Payer: COMMERCIAL

## 2023-02-28 VITALS — WEIGHT: 195 LBS | SYSTOLIC BLOOD PRESSURE: 130 MMHG | BODY MASS INDEX: 33.47 KG/M2 | DIASTOLIC BLOOD PRESSURE: 58 MMHG

## 2023-02-28 DIAGNOSIS — Z34.90 PREGNANCY, UNSPECIFIED GESTATIONAL AGE: ICD-10-CM

## 2023-02-28 DIAGNOSIS — Z34.80 SUPERVISION OF OTHER NORMAL PREGNANCY, ANTEPARTUM: Primary | ICD-10-CM

## 2023-02-28 PROCEDURE — 0502F SUBSEQUENT PRENATAL CARE: CPT | Performed by: OBSTETRICS & GYNECOLOGY

## 2023-02-28 RX ORDER — CYCLOBENZAPRINE HCL 10 MG
5 TABLET ORAL
Qty: 20 TABLET | Refills: 0 | Status: SHIPPED | OUTPATIENT
Start: 2023-02-28

## 2023-02-28 NOTE — PROGRESS NOTES
Patient here for return OB visit. She reports irregular cramping. Reports good hydration. Denies UTI symptoms. States symptoms are improved from last week. She reports good fetal movement. She denies vaginal bleeding, loss of fluid, abnormal vaginal discharge, cramping, or contractions. Discussed strict return precautions and reasons to be seen prior to next scheduled visit. All questions answered. Visit Vitals  BP (!) 130/58   Wt 195 lb (88.5 kg)   BMI 33.47 kg/m²      Problem list:   Prior CSx1  Short interval pregnancy  -7 weeks postpartum when she conceived  Asthma, mild intermittent  -Rare albuterol inhaler use    Jessi Wagner MD

## 2023-03-14 ENCOUNTER — ROUTINE PRENATAL (OUTPATIENT)
Dept: OBGYN CLINIC | Age: 23
End: 2023-03-14
Payer: COMMERCIAL

## 2023-03-14 VITALS — DIASTOLIC BLOOD PRESSURE: 70 MMHG | WEIGHT: 198 LBS | SYSTOLIC BLOOD PRESSURE: 130 MMHG | BODY MASS INDEX: 33.99 KG/M2

## 2023-03-14 DIAGNOSIS — Z34.90 PREGNANCY, UNSPECIFIED GESTATIONAL AGE: Primary | ICD-10-CM

## 2023-03-14 PROCEDURE — 0502F SUBSEQUENT PRENATAL CARE: CPT | Performed by: OBSTETRICS & GYNECOLOGY

## 2023-03-14 NOTE — PROGRESS NOTES
Doing well, but tired  +FM  Denies LOF, VB, ctx    Blood pressure 130/70, weight 198 lb (89.8 kg), last menstrual period 06/30/2022, unknown if currently breastfeeding.     Problem List  Prior CSx1  Short interval pregnancy   -repeat scheduled

## 2023-04-07 ENCOUNTER — ROUTINE PRENATAL (OUTPATIENT)
Dept: OBGYN CLINIC | Age: 23
End: 2023-04-07

## 2023-04-07 PROCEDURE — 0502F SUBSEQUENT PRENATAL CARE: CPT | Performed by: OBSTETRICS & GYNECOLOGY

## 2023-04-18 ENCOUNTER — ANESTHESIA EVENT (OUTPATIENT)
Dept: LABOR AND DELIVERY | Age: 23
DRG: 540 | End: 2023-04-18
Payer: COMMERCIAL

## 2023-04-19 ENCOUNTER — HOSPITAL ENCOUNTER (INPATIENT)
Age: 23
LOS: 1 days | Discharge: HOME | DRG: 540 | End: 2023-04-20
Attending: OBSTETRICS & GYNECOLOGY | Admitting: OBSTETRICS & GYNECOLOGY
Payer: COMMERCIAL

## 2023-04-19 ENCOUNTER — ANESTHESIA (OUTPATIENT)
Dept: LABOR AND DELIVERY | Age: 23
DRG: 540 | End: 2023-04-19
Payer: COMMERCIAL

## 2023-04-19 DIAGNOSIS — Z98.891 S/P CESAREAN SECTION: Primary | ICD-10-CM

## 2023-04-19 LAB
ABO + RH BLD: NORMAL
BLOOD GROUP ANTIBODIES SERPL: NORMAL
ERYTHROCYTE [DISTWIDTH] IN BLOOD BY AUTOMATED COUNT: 13.6 % (ref 11.5–14.5)
HCT VFR BLD AUTO: 35.4 % (ref 35–47)
HGB BLD-MCNC: 10.9 G/DL (ref 11.5–16)
MCH RBC QN AUTO: 28.2 PG (ref 26–34)
MCHC RBC AUTO-ENTMCNC: 30.8 G/DL (ref 30–36.5)
MCV RBC AUTO: 91.5 FL (ref 80–99)
NRBC # BLD: 0 K/UL (ref 0–0.01)
NRBC BLD-RTO: 0 PER 100 WBC
PLATELET # BLD AUTO: 285 K/UL (ref 150–400)
PMV BLD AUTO: 10 FL (ref 8.9–12.9)
RBC # BLD AUTO: 3.87 M/UL (ref 3.8–5.2)
SPECIMEN EXP DATE BLD: NORMAL
WBC # BLD AUTO: 8.8 K/UL (ref 3.6–11)

## 2023-04-19 PROCEDURE — 74011250636 HC RX REV CODE- 250/636: Performed by: ANESTHESIOLOGY

## 2023-04-19 PROCEDURE — 76010000391 HC C SECN FIRST 1 HR: Performed by: OBSTETRICS & GYNECOLOGY

## 2023-04-19 PROCEDURE — 4A1HXCZ MONITORING OF PRODUCTS OF CONCEPTION, CARDIAC RATE, EXTERNAL APPROACH: ICD-10-PCS | Performed by: OBSTETRICS & GYNECOLOGY

## 2023-04-19 PROCEDURE — 74011250636 HC RX REV CODE- 250/636: Performed by: OBSTETRICS & GYNECOLOGY

## 2023-04-19 PROCEDURE — 74011250636 HC RX REV CODE- 250/636: Performed by: NURSE ANESTHETIST, CERTIFIED REGISTERED

## 2023-04-19 PROCEDURE — 74011250636 HC RX REV CODE- 250/636: Performed by: STUDENT IN AN ORGANIZED HEALTH CARE EDUCATION/TRAINING PROGRAM

## 2023-04-19 PROCEDURE — 36415 COLL VENOUS BLD VENIPUNCTURE: CPT

## 2023-04-19 PROCEDURE — 75410000003 HC RECOV DEL/VAG/CSECN EA 0.5 HR: Performed by: OBSTETRICS & GYNECOLOGY

## 2023-04-19 PROCEDURE — 77030007866 HC KT SPN ANES BBMI -B: Performed by: NURSE ANESTHETIST, CERTIFIED REGISTERED

## 2023-04-19 PROCEDURE — 65410000002 HC RM PRIVATE OB

## 2023-04-19 PROCEDURE — 76010000392 HC C SECN EA ADDL 0.5 HR: Performed by: OBSTETRICS & GYNECOLOGY

## 2023-04-19 PROCEDURE — 74011000250 HC RX REV CODE- 250: Performed by: STUDENT IN AN ORGANIZED HEALTH CARE EDUCATION/TRAINING PROGRAM

## 2023-04-19 PROCEDURE — 74011000250 HC RX REV CODE- 250: Performed by: OBSTETRICS & GYNECOLOGY

## 2023-04-19 PROCEDURE — 76060000078 HC EPIDURAL ANESTHESIA: Performed by: OBSTETRICS & GYNECOLOGY

## 2023-04-19 PROCEDURE — 86900 BLOOD TYPING SEROLOGIC ABO: CPT

## 2023-04-19 PROCEDURE — 85027 COMPLETE CBC AUTOMATED: CPT

## 2023-04-19 RX ORDER — BUPIVACAINE HYDROCHLORIDE 7.5 MG/ML
INJECTION, SOLUTION INTRASPINAL
Status: COMPLETED | OUTPATIENT
Start: 2023-04-19 | End: 2023-04-19

## 2023-04-19 RX ORDER — FENTANYL CITRATE 50 UG/ML
INJECTION, SOLUTION INTRAMUSCULAR; INTRAVENOUS
Status: COMPLETED | OUTPATIENT
Start: 2023-04-19 | End: 2023-04-19

## 2023-04-19 RX ORDER — SODIUM CHLORIDE 0.9 % (FLUSH) 0.9 %
5-40 SYRINGE (ML) INJECTION AS NEEDED
Status: DISCONTINUED | OUTPATIENT
Start: 2023-04-19 | End: 2023-04-21 | Stop reason: HOSPADM

## 2023-04-19 RX ORDER — DIPHENHYDRAMINE HCL 25 MG
25 CAPSULE ORAL
Status: DISCONTINUED | OUTPATIENT
Start: 2023-04-19 | End: 2023-04-21 | Stop reason: HOSPADM

## 2023-04-19 RX ORDER — SODIUM CHLORIDE 0.9 % (FLUSH) 0.9 %
5-40 SYRINGE (ML) INJECTION EVERY 8 HOURS
Status: DISCONTINUED | OUTPATIENT
Start: 2023-04-19 | End: 2023-04-19 | Stop reason: HOSPADM

## 2023-04-19 RX ORDER — ACETAMINOPHEN 500 MG
1000 TABLET ORAL EVERY 8 HOURS
Status: DISCONTINUED | OUTPATIENT
Start: 2023-04-19 | End: 2023-04-21 | Stop reason: HOSPADM

## 2023-04-19 RX ORDER — SODIUM CHLORIDE, SODIUM LACTATE, POTASSIUM CHLORIDE, CALCIUM CHLORIDE 600; 310; 30; 20 MG/100ML; MG/100ML; MG/100ML; MG/100ML
1000 INJECTION, SOLUTION INTRAVENOUS CONTINUOUS
Status: DISCONTINUED | OUTPATIENT
Start: 2023-04-19 | End: 2023-04-19 | Stop reason: HOSPADM

## 2023-04-19 RX ORDER — KETOROLAC TROMETHAMINE 30 MG/ML
30 INJECTION, SOLUTION INTRAMUSCULAR; INTRAVENOUS
Status: DISPENSED | OUTPATIENT
Start: 2023-04-19 | End: 2023-04-20

## 2023-04-19 RX ORDER — PHENYLEPHRINE HCL IN 0.9% NACL 0.4MG/10ML
SYRINGE (ML) INTRAVENOUS
Status: DISCONTINUED | OUTPATIENT
Start: 2023-04-19 | End: 2023-04-19 | Stop reason: HOSPADM

## 2023-04-19 RX ORDER — OXYTOCIN/RINGER'S LACTATE 30/500 ML
87.3 PLASTIC BAG, INJECTION (ML) INTRAVENOUS AS NEEDED
Status: DISCONTINUED | OUTPATIENT
Start: 2023-04-19 | End: 2023-04-21 | Stop reason: HOSPADM

## 2023-04-19 RX ORDER — MORPHINE SULFATE 1 MG/ML
INJECTION, SOLUTION EPIDURAL; INTRATHECAL; INTRAVENOUS
Status: COMPLETED | OUTPATIENT
Start: 2023-04-19 | End: 2023-04-19

## 2023-04-19 RX ORDER — DIPHENHYDRAMINE HYDROCHLORIDE 50 MG/ML
12.5 INJECTION, SOLUTION INTRAMUSCULAR; INTRAVENOUS ONCE
Status: COMPLETED | OUTPATIENT
Start: 2023-04-19 | End: 2023-04-19

## 2023-04-19 RX ORDER — SIMETHICONE 80 MG
80 TABLET,CHEWABLE ORAL AS NEEDED
Status: DISCONTINUED | OUTPATIENT
Start: 2023-04-19 | End: 2023-04-21 | Stop reason: HOSPADM

## 2023-04-19 RX ORDER — OXYTOCIN/RINGER'S LACTATE 30/500 ML
87.3 PLASTIC BAG, INJECTION (ML) INTRAVENOUS AS NEEDED
Status: COMPLETED | OUTPATIENT
Start: 2023-04-19 | End: 2023-04-19

## 2023-04-19 RX ORDER — OXYCODONE HYDROCHLORIDE 5 MG/1
5 TABLET ORAL
Status: DISCONTINUED | OUTPATIENT
Start: 2023-04-19 | End: 2023-04-21 | Stop reason: HOSPADM

## 2023-04-19 RX ORDER — SODIUM CHLORIDE 0.9 % (FLUSH) 0.9 %
5-40 SYRINGE (ML) INJECTION AS NEEDED
Status: DISCONTINUED | OUTPATIENT
Start: 2023-04-19 | End: 2023-04-19 | Stop reason: HOSPADM

## 2023-04-19 RX ORDER — OXYTOCIN/RINGER'S LACTATE 30/500 ML
10 PLASTIC BAG, INJECTION (ML) INTRAVENOUS AS NEEDED
Status: DISCONTINUED | OUTPATIENT
Start: 2023-04-19 | End: 2023-04-21 | Stop reason: HOSPADM

## 2023-04-19 RX ORDER — NALOXONE HYDROCHLORIDE 0.4 MG/ML
0.4 INJECTION, SOLUTION INTRAMUSCULAR; INTRAVENOUS; SUBCUTANEOUS AS NEEDED
Status: DISCONTINUED | OUTPATIENT
Start: 2023-04-19 | End: 2023-04-21 | Stop reason: HOSPADM

## 2023-04-19 RX ORDER — DIPHENHYDRAMINE HYDROCHLORIDE 50 MG/ML
25 INJECTION, SOLUTION INTRAMUSCULAR; INTRAVENOUS
Status: DISCONTINUED | OUTPATIENT
Start: 2023-04-19 | End: 2023-04-21 | Stop reason: HOSPADM

## 2023-04-19 RX ORDER — DEXAMETHASONE SODIUM PHOSPHATE 4 MG/ML
INJECTION, SOLUTION INTRA-ARTICULAR; INTRALESIONAL; INTRAMUSCULAR; INTRAVENOUS; SOFT TISSUE AS NEEDED
Status: DISCONTINUED | OUTPATIENT
Start: 2023-04-19 | End: 2023-04-19 | Stop reason: HOSPADM

## 2023-04-19 RX ORDER — ONDANSETRON 2 MG/ML
INJECTION INTRAMUSCULAR; INTRAVENOUS AS NEEDED
Status: DISCONTINUED | OUTPATIENT
Start: 2023-04-19 | End: 2023-04-19 | Stop reason: HOSPADM

## 2023-04-19 RX ORDER — ZOLPIDEM TARTRATE 5 MG/1
5 TABLET ORAL
Status: DISCONTINUED | OUTPATIENT
Start: 2023-04-19 | End: 2023-04-21 | Stop reason: HOSPADM

## 2023-04-19 RX ORDER — SODIUM CHLORIDE 0.9 % (FLUSH) 0.9 %
5-40 SYRINGE (ML) INJECTION EVERY 8 HOURS
Status: DISCONTINUED | OUTPATIENT
Start: 2023-04-19 | End: 2023-04-21 | Stop reason: HOSPADM

## 2023-04-19 RX ORDER — ONDANSETRON 4 MG/1
4 TABLET, ORALLY DISINTEGRATING ORAL
Status: DISCONTINUED | OUTPATIENT
Start: 2023-04-19 | End: 2023-04-21 | Stop reason: HOSPADM

## 2023-04-19 RX ORDER — SODIUM CHLORIDE, SODIUM LACTATE, POTASSIUM CHLORIDE, CALCIUM CHLORIDE 600; 310; 30; 20 MG/100ML; MG/100ML; MG/100ML; MG/100ML
125 INJECTION, SOLUTION INTRAVENOUS CONTINUOUS
Status: DISCONTINUED | OUTPATIENT
Start: 2023-04-19 | End: 2023-04-21 | Stop reason: HOSPADM

## 2023-04-19 RX ADMIN — SODIUM CHLORIDE, POTASSIUM CHLORIDE, SODIUM LACTATE AND CALCIUM CHLORIDE 125 ML/HR: 600; 310; 30; 20 INJECTION, SOLUTION INTRAVENOUS at 19:07

## 2023-04-19 RX ADMIN — DEXAMETHASONE SODIUM PHOSPHATE 4 MG: 4 INJECTION, SOLUTION INTRAMUSCULAR; INTRAVENOUS at 13:21

## 2023-04-19 RX ADMIN — SODIUM CHLORIDE, POTASSIUM CHLORIDE, SODIUM LACTATE AND CALCIUM CHLORIDE 1000 ML: 600; 310; 30; 20 INJECTION, SOLUTION INTRAVENOUS at 12:32

## 2023-04-19 RX ADMIN — KETOROLAC TROMETHAMINE 30 MG: 30 INJECTION, SOLUTION INTRAMUSCULAR at 20:40

## 2023-04-19 RX ADMIN — Medication 40 MCG/MIN: at 13:21

## 2023-04-19 RX ADMIN — FENTANYL CITRATE 15 MCG: 50 INJECTION, SOLUTION INTRAMUSCULAR; INTRAVENOUS at 13:23

## 2023-04-19 RX ADMIN — DIPHENHYDRAMINE HYDROCHLORIDE 25 MG: 50 INJECTION, SOLUTION INTRAMUSCULAR; INTRAVENOUS at 20:47

## 2023-04-19 RX ADMIN — DIPHENHYDRAMINE HYDROCHLORIDE 12.5 MG: 50 INJECTION, SOLUTION INTRAMUSCULAR; INTRAVENOUS at 16:23

## 2023-04-19 RX ADMIN — MORPHINE SULFATE 0.15 MG: 1 INJECTION EPIDURAL; INTRATHECAL; INTRAVENOUS at 13:23

## 2023-04-19 RX ADMIN — ONDANSETRON HYDROCHLORIDE 4 MG: 2 INJECTION, SOLUTION INTRAMUSCULAR; INTRAVENOUS at 13:21

## 2023-04-19 RX ADMIN — Medication 909 ML/HR: at 13:50

## 2023-04-19 RX ADMIN — CEFAZOLIN 2 G: 1 INJECTION, POWDER, FOR SOLUTION INTRAMUSCULAR; INTRAVENOUS at 13:05

## 2023-04-19 RX ADMIN — BUPIVACAINE HYDROCHLORIDE IN DEXTROSE 11.25 MG: 7.5 INJECTION, SOLUTION SUBARACHNOID at 13:23

## 2023-04-19 NOTE — ANESTHESIA PREPROCEDURE EVALUATION
Relevant Problems   No relevant active problems       Anesthetic History   No history of anesthetic complications            Review of Systems / Medical History  Patient summary reviewed, nursing notes reviewed and pertinent labs reviewed    Pulmonary            Asthma   Pertinent negatives: No COPD and recent URI     Neuro/Psych         Psychiatric history  Pertinent negatives: No seizures and CVA   Cardiovascular  Within defined limits              Pertinent negatives: No hypertension, past MI and CHF  Exercise tolerance: >4 METS     GI/Hepatic/Renal     GERD        Pertinent negatives: No liver disease and renal disease   Endo/Other  Within defined limits        Pertinent negatives: No diabetes   Other Findings              Physical Exam    Airway  Mallampati: II  TM Distance: > 6 cm  Neck ROM: normal range of motion   Mouth opening: Normal     Cardiovascular  Regular rate and rhythm,  S1 and S2 normal,  no murmur, click, rub, or gallop  Rhythm: regular  Rate: normal         Dental  No notable dental hx       Pulmonary  Breath sounds clear to auscultation               Abdominal  GI exam deferred       Other Findings            Anesthetic Plan    ASA: 2  Anesthesia type: spinal            Anesthetic plan and risks discussed with: Patient

## 2023-04-19 NOTE — BRIEF OP NOTE
Brief Postoperative Note    Patient: Brett Springer  YOB: 2000  MRN: [id number]    Date of Procedure: 2023     Pre-Op Diagnosis:   1) Prior CS  2) Short internal pregnancy     Post-Op Diagnosis:   1) Prior CS  2) Short internal pregnancy     Procedure(s):  REPEAT LOW TRANSVERSE  SECTION    Surgeon(s):  Giovanni Gave, MD Jenet Stamps, MD Dr. Verner Spiegel, the assistant surgeon, was present during the procedure. The physician's presence was necessary due to prior CS and short interval pregnancy. The assistant surgeon performed significant portions of the surgery, including incising tissue, clamping, control of bleeding with suturing and cauterization, and decision making at challenging portions of the procedure. This assistance was necessary to accomplish the optimal outcome for the patient, above and beyond what a non-MD assistant could have provided. Surgical Assistant: None    Anesthesia: Epidural     Estimated Blood Loss (mL): 675     Complications: None    Specimens:   ID Type Source Tests Collected by Time Destination   1 :  Placenta   Jessica Rosales MD 2023 1327 Discarded        Implants:   Implant Name Type Inv. Item Serial No.  Lot No. LRB No. Used Action   Surgicel Powder   [id number]  [id number] N/A 1 Implanted       Drains: quevedo to gravity     Findings: Very thin BOB. Grossly normal appearing fallopian tubes and ovaries.      Electronically Signed by Boby Vences MD on 2023 at 2:34 PM

## 2023-04-19 NOTE — ANESTHESIA POSTPROCEDURE EVALUATION
Post-Anesthesia Evaluation and Assessment    Patient: Amadeo Todd MRN: [id number]  SSN: [id number]    YOB: 2000  Age: 20 y.o. Sex: female      I have evaluated the patient and they are stable and ready for discharge from the PACU. Cardiovascular Function/Vital Signs  Visit Vitals  BP (!) 128/59   Pulse 92   Temp 36.6 °C (97.8 °F)   Resp 16   Ht 5' 4\" (1.626 m)   Wt 90.3 kg (199 lb)   Breastfeeding No   BMI 34.16 kg/m²       Patient is status post Epidural anesthesia for Procedure(s):   SECTION. Nausea/Vomiting: None    Postoperative hydration reviewed and adequate. Pain:  Pain Scale 1: Numeric (0 - 10) (23 143)  Pain Intensity 1: 0 (23 143)   Managed    Neurological Status:   Neuro (WDL): Within Defined Limits (23 143)   At baseline    Mental Status, Level of Consciousness: Alert and  oriented to person, place, and time    Pulmonary Status:   O2 Device: None (23 1432)   Adequate oxygenation and airway patent    Complications related to anesthesia: None    Post-anesthesia assessment completed. No concerns    Signed By: Goldy Murillo DO     2023                Procedure(s):   SECTION.     spinal    <BSHSIANPOST>    INITIAL Post-op Vital signs:   Vitals Value Taken Time   /59 23 1456   Temp     Pulse 92 23 1456   Resp     SpO2

## 2023-04-19 NOTE — ROUTINE PROCESS
OBSBAR report given by Bouchra Palacio RN offgoing shift nurse to JADEN Burch RN oncoming shift nurse.

## 2023-04-19 NOTE — PROGRESS NOTES
4:53 PM  TRANSFER - IN REPORT:    Verbal report received from Meagan Boothe RN (name) on Courtney Simmons  being received from L&D (unit) for routine progression of care      Report consisted of patients Situation, Background, Assessment and   Recommendations(SBAR). Information from the following report(s) SBAR was reviewed with the receiving nurse. Opportunity for questions and clarification was provided. Assessment completed upon patients arrival to unit and care assumed.

## 2023-04-19 NOTE — PROGRESS NOTES
7389 Patient arrived to L&D 12 for scheduled repeat c section. EFM applied, vitals and history obtained. PIV placed. 1318 In OR    1349 baby GIRL [place] born via lt c/s, APGARs 9/9    1436 Out of OR    1640 TRANSFER - OUT REPORT:    Verbal report given to   Rachael Caraballo RN(name) on Delonte Danger  being transferred to MIU(unit) for routine post - op       Report consisted of patients Situation, Background, Assessment and   Recommendations(SBAR). Information from the following report(s) SBAR, Intake/Output, MAR, and Recent Results was reviewed with the receiving nurse. Lines:   Peripheral IV 04/19/23 Anterior; Left Forearm (Active)        Opportunity for questions and clarification was provided.       Patient transported with:   Registered Nurse

## 2023-04-19 NOTE — ANESTHESIA PROCEDURE NOTES
Spinal Block    Start time: 4/19/2023 1:21 PM  End time: 4/19/2023 1:23 PM  Performed by: Suzanne Nichole DO  Authorized by: Suzanne Nichole DO     Pre-procedure:   Indications: primary anesthetic  Preanesthetic Checklist: patient identified, risks and benefits discussed, site marked, patient being monitored and timeout performed      Spinal Block:   Patient Position:  Seated  Prep Region:  Lumbar  Prep: chlorhexidine and patient draped      Location:  L3-4  Technique:  Single shot  Local: morphine (PF) 1 mg/mL Intrathecal - Intrathecal   0.15 mg - 4/19/2023 1:23:00 PM  fentaNYL citrate (PF) Intrathecal - Intrathecal   15 mcg - 4/19/2023 1:23:00 PM  bupivacaine 0.75% in dextrose 8.25% preserv-free (SENSORCAINE) Intrathecal - Intrathecal   11.25 mg - 4/19/2023 1:23:00 PM    Med Admin Time: 4/19/2023 1:23 PM    Needle:   Needle Type:  Pencil-tip  Needle Gauge:  25 G  Attempts:  1      Events: CSF confirmed, no blood with aspiration and no paresthesia        Assessment:  Insertion:  Uncomplicated  Patient tolerance:  Patient tolerated the procedure well with no immediate complications

## 2023-04-19 NOTE — H&P
History & Physical    Name: Luis Dominguez MRN: [id number]  SSN: [id number]    YOB: 2000  Age: 20 y.o. Sex: female        Subjective:     Estimated Date of Delivery: 23  OB History          3    Para   1    Term   1            AB   1    Living   1         SAB   1    IAB        Ectopic        Molar        Multiple   1    Live Births   1                Ms. Eugene Dallas is admitted with pregnancy at 39w0d for  Section. Prenatal course was complicated by prior CS. Please see prenatal records for details. Patient Active Problem List    Diagnosis    Pregnancy     REPEAT C/S 2023  Primary Provider: Raffi Meléndez ; Sab 2021 twins @10wks, C/S 2022  EDC by: ultrasound at 15w6d   Social:  -Jett Rodriguez Aelxis Serhaley labs: records released singed. A positive. AFP neg. Bernadine Griffin (after great grandmother)  Genetic Screening:  NIPT NL at [facility]. MSAFP collected 2022 negative  Anatomy: WNL   GTT: 23 passed   Flu:22  GBS:negative 23  PP plans: breastfeeding. Nexplanon     Problem List  1)Prior CSx1  Short interval pregnancy   -repeat scheduled       Social phobia    Cyclothymia    Posttraumatic stress disorder    Asthma, cough variant    Seasonal allergic rhinitis     No specialty comments available. Past Medical History:   Diagnosis Date    Asthma     Hx of multiple concussions     while playing soccer    Hx of streptococcal infection     7 X last year    Psychiatric problem     Bipolar.  Anxiety, Depression     Past Surgical History:   Procedure Laterality Date    HX KNEE ARTHROSCOPY      HX OTHER SURGICAL      Ligament reconstruction in left elbow    HX OTHER SURGICAL      Cyst removal of left ankle    HX TONSILLECTOMY  2018     Social History     Occupational History    Not on file   Tobacco Use    Smoking status: Never     Passive exposure: Never    Smokeless tobacco: Never   Substance and Sexual Activity    Alcohol use: Never Drug use: Never    Sexual activity: Yes     Partners: Male     Birth control/protection: None     Family History   Problem Relation Age of Onset    Other Father         incarcerated for drugs and conspiracy to murder    Breast Cancer Paternal Grandmother        No Known Allergies  Prior to Admission medications    Medication Sig Start Date End Date Taking? Authorizing Provider   PNV Comb #2-Iron-FA-Omega 3 29-1-400 mg cmpk Take  by mouth. Yes Provider, Historical   cyclobenzaprine (FLEXERIL) 10 mg tablet Take 0.5 Tablets by mouth three (3) times daily as needed for Muscle Spasm(s). Patient not taking: Reported on 4/19/2023 2/28/23   Estefani Shaikh MD   albuterol (PROVENTIL HFA, VENTOLIN HFA, PROAIR HFA) 90 mcg/actuation inhaler Take 2 Puffs by inhalation every four (4) hours as needed for Wheezing. Patient not taking: Reported on 3/14/2023 12/6/22   Estefani Shaikh MD   butalbital-acetaminophen-caffeine (FIORICET, ESGIC) -40 mg per tablet Take 1 Tablet by mouth every eight (8) hours as needed. Patient not taking: Reported on 3/14/2023 10/27/22   Provider, Historical        Review of Systems: A comprehensive review of systems was negative except for that written in the HPI. Objective:     Vitals:  Vitals:    04/19/23 0952 04/19/23 0953   BP:  125/68   Pulse:  88   Weight: 199 lb (90.3 kg)    Height: 5' 4\" (1.626 m)         Physical Exam:  Patient without distress.   Abdomen: soft, nontender  Fundus: soft and non tender  Lower Extremities:  - Edema trace  Membranes:  Intact  Fetal Heart Rate: Reactive    Prenatal Labs:   Lab Results   Component Value Date/Time    Rubella, External Immune - 16.4 11/05/2021 12:00 AM    GrBStrep, External Negative 04/25/2022 12:00 AM    HBsAg, External Negative 11/05/2021 12:00 AM    HIV, External negative 10/20/2022 12:00 AM    RPR, External 13.1 10/20/2022 12:00 AM    Gonorrhea, External Negative 10/15/2021 12:00 AM    Chlamydia, External Negative 10/15/2021 12:00 AM Assessment/Plan:   Daniel Canela is a 20 y.o.  at 39w0d   Prior CSx1  Short interval pregnancy   GBS negative     Plan:   Repeat CS  Ancef  SCD's    Discussed risks of surgery which include but are not limited infection, bleeding, transfusion, VTE, readmission, abscess drainage, injury to abdominal organs including bowel, bladder, ureters, vessels, nerves, injury to organs that may not be recognized at time of initial surgery requiring reoperation and risk of death. All questions answered.      Signed By:  Salvador Stanley MD     2023

## 2023-04-20 VITALS
HEART RATE: 99 BPM | WEIGHT: 199 LBS | BODY MASS INDEX: 33.97 KG/M2 | OXYGEN SATURATION: 97 % | HEIGHT: 64 IN | RESPIRATION RATE: 16 BRPM | TEMPERATURE: 98 F | DIASTOLIC BLOOD PRESSURE: 78 MMHG | SYSTOLIC BLOOD PRESSURE: 122 MMHG

## 2023-04-20 LAB
BASOPHILS # BLD: 0 K/UL (ref 0–0.1)
BASOPHILS NFR BLD: 0 % (ref 0–1)
DIFFERENTIAL METHOD BLD: ABNORMAL
EOSINOPHIL # BLD: 0.1 K/UL (ref 0–0.4)
EOSINOPHIL NFR BLD: 0 % (ref 0–7)
ERYTHROCYTE [DISTWIDTH] IN BLOOD BY AUTOMATED COUNT: 13.3 % (ref 11.5–14.5)
HCT VFR BLD AUTO: 33.7 % (ref 35–47)
HGB BLD-MCNC: 10.4 G/DL (ref 11.5–16)
IMM GRANULOCYTES # BLD AUTO: 0.1 K/UL (ref 0–0.04)
IMM GRANULOCYTES NFR BLD AUTO: 0 % (ref 0–0.5)
LYMPHOCYTES # BLD: 3 K/UL (ref 0.8–3.5)
LYMPHOCYTES NFR BLD: 18 % (ref 12–49)
MCH RBC QN AUTO: 28.9 PG (ref 26–34)
MCHC RBC AUTO-ENTMCNC: 30.9 G/DL (ref 30–36.5)
MCV RBC AUTO: 93.6 FL (ref 80–99)
MONOCYTES # BLD: 1.9 K/UL (ref 0–1)
MONOCYTES NFR BLD: 11 % (ref 5–13)
NEUTS SEG # BLD: 11.6 K/UL (ref 1.8–8)
NEUTS SEG NFR BLD: 71 % (ref 32–75)
NRBC # BLD: 0 K/UL (ref 0–0.01)
NRBC BLD-RTO: 0 PER 100 WBC
PLATELET # BLD AUTO: 286 K/UL (ref 150–400)
PMV BLD AUTO: 10.5 FL (ref 8.9–12.9)
RBC # BLD AUTO: 3.6 M/UL (ref 3.8–5.2)
WBC # BLD AUTO: 16.6 K/UL (ref 3.6–11)

## 2023-04-20 PROCEDURE — 74011250637 HC RX REV CODE- 250/637: Performed by: OBSTETRICS & GYNECOLOGY

## 2023-04-20 PROCEDURE — 36415 COLL VENOUS BLD VENIPUNCTURE: CPT

## 2023-04-20 PROCEDURE — 85025 COMPLETE CBC W/AUTO DIFF WBC: CPT

## 2023-04-20 PROCEDURE — 74011250636 HC RX REV CODE- 250/636: Performed by: OBSTETRICS & GYNECOLOGY

## 2023-04-20 RX ORDER — IBUPROFEN 800 MG/1
800 TABLET ORAL
Qty: 100 TABLET | Refills: 0 | Status: SHIPPED | OUTPATIENT
Start: 2023-04-20

## 2023-04-20 RX ORDER — OXYCODONE HYDROCHLORIDE 5 MG/1
5 TABLET ORAL
Qty: 12 TABLET | Refills: 0 | Status: SHIPPED | OUTPATIENT
Start: 2023-04-20 | End: 2023-04-23

## 2023-04-20 RX ORDER — IBUPROFEN 400 MG/1
800 TABLET ORAL
Status: DISCONTINUED | OUTPATIENT
Start: 2023-04-20 | End: 2023-04-21 | Stop reason: HOSPADM

## 2023-04-20 RX ORDER — ACETAMINOPHEN 500 MG
1000 TABLET ORAL EVERY 8 HOURS
Qty: 100 TABLET | Refills: 0 | Status: SHIPPED | OUTPATIENT
Start: 2023-04-20

## 2023-04-20 RX ADMIN — KETOROLAC TROMETHAMINE 30 MG: 30 INJECTION, SOLUTION INTRAMUSCULAR at 14:16

## 2023-04-20 RX ADMIN — KETOROLAC TROMETHAMINE 30 MG: 30 INJECTION, SOLUTION INTRAMUSCULAR at 03:24

## 2023-04-20 RX ADMIN — ACETAMINOPHEN 1000 MG: 500 TABLET ORAL at 11:37

## 2023-04-20 RX ADMIN — ACETAMINOPHEN 1000 MG: 500 TABLET ORAL at 21:40

## 2023-04-20 RX ADMIN — IBUPROFEN 800 MG: 400 TABLET ORAL at 21:40

## 2023-04-20 RX ADMIN — ACETAMINOPHEN 500 MG: 500 TABLET ORAL at 06:02

## 2023-04-20 NOTE — OP NOTES
[address]  OPERATIVE REPORT    Name:  Alexei Richards  MR#:  [id number]  :  2000  ACCOUNT #:  [de-identified]  DATE OF SERVICE:  2023    PREOPERATIVE DIAGNOSES:  1. Prior  section. 2.  Short interval pregnancy. POSTOPERATIVE DIAGNOSES:  1. Prior  section. 2.  Short interval pregnancy. 3.  Thin lower uterine segment. PROCEDURE PERFORMED:  Repeat low transverse  section. SURGEON:  Jessi Poole MD    ASSISTANT:  MD Dr. Lyubov Frederick, the assistant surgeon, was present during the procedure. The physician's presence was necessary due to history of  section with short interval pregnancy. The assistant surgeon performed significant portions of the surgery, including incising tissue, clamping, control of bleeding with suturing and cauterization, and decision making at challenging portions of the procedure. This assistance was necessary to accomplish the optimal outcome for the patient, above and beyond what a non-MD assistant could have provided. ANESTHESIA:  Spinal.    COMPLICATIONS:  None. SPECIMENS REMOVED:  None. IMPLANTS:  Surgicel powder. ESTIMATED BLOOD LOSS:  300 mL. DRAINS:  [place] to gravity. FINDINGS:  A very thin lower uterine segment. Grossly normal appearing fallopian tubes and ovaries. Liveborn female  in vertex presentation. Clear amniotic fluid. Nuchal cord x2. PROCEDURE:  After informed consent was obtained, the patient was taken to the operating room and placed under satisfactory anesthesia. She was placed in the dorsal lithotomy position with a left lateral tilt. A quevedo catheter was placed by the nursing staff in a normal sterile fashion. She was prepped and draped in normal sterile fashion. Time-out was performed to ensure correct patient and correct procedure.    A Pfannenstiel incision was made with a scalpel approximately 2 cm above the pubic symphysis and carried through to the underlying layer of fascia. The fascia was nicked in the midline. The incision was extended laterally with Moser scissors. The superficial aspect of the fascial incision was elevated on either side of the midline with Kocher clamps, and the rectus muscle was taken down with blunt and sharp dissection using Moser scissors. Same was done for the inferior aspect of the fascial layer. The rectus muscle was identified and entered bluntly in the midline. The peritoneum was identified and entered bluntly. The peritoneal opening was increased with gentle traction. A bladder blade was placed and the vesicouterine peritoneum was identified, grasped with pickups, and entered sharply with Metzenbaum. The incision was extended laterally and the bladder flap was created digitally. The bladder blade was repositioned and a low transverse incision was made on the uterus. The lower uterine segment was noted to be very thin. Amniotomy was performed during the hysterotomy. The baby's head was elevated out of the pelvis, flexed and brought into the hysterotomy. The shoulders and body were delivered without difficulty after reducing nuchal cord x2. The cord was clamped and cut and the  was handed off to the nursing staff. Oxytocin infusion was started. The placenta was delivered with gentle traction. The uterus was exteriorized with a wet lap. The endometrium was cleaned with multiple wet laps. The uterus was closed using 0 Vicryl in a running locked fashion and tied. One additional figure-of-eight suture was needed on the hysterotomy to render hemostatic. The uterus was placed back in the abdominal cavity and hysterotomy revisited and noted to be hemostatic. All gutters were cleared of any remaining clots and debris. Surgicel powder was used on the rectus muscle. The fascia was identified and closed with a running 1 CTX of Vicryl. Hemostasis was noted.   The subcutaneous was closed with a 3-0 XLH of chromic. The patient desired scar revision. Therefore, Allis clamps were used to grasp the patient's old incision and it was removed with Bovie cautery. The incision was then closed with a 3-0 Monocryl on a Ron needle. Steri-Strips were placed on top. The patient tolerated the procedure well and returned to the recovery room in stable condition. All sponge, needle, and instrument counts were correct at the end of the procedure per OR staff.       Bridget Schmidt MD      [id number]  D:  04/20/2023 11:08  T:  04/20/2023 18:21  JOB #:  [id number]

## 2023-04-20 NOTE — PROGRESS NOTES
Post-Operative Day Number 1 OBGYN Progress Note    Patient doing well post-op day 1 from  delivery without significant complaints. Pain controlled on current medication. Voiding without difficulty, normal lochia. Vitals:  Patient Vitals for the past 8 hrs:   BP Temp Pulse Resp SpO2   23 0540 102/64 99.3 °F (37.4 °C) 87 16 97 %     Temp (24hrs), Av.4 °F (36.9 °C), Min:97.8 °F (36.6 °C), Max:99.3 °F (37.4 °C)      Vital signs stable, afebrile. Exam:  Patient without distress. Abdomen soft, fundus firm at level of umbilicus, nontender. Dressing clean, dry and intact               Lower extremities are negative for swelling, cords or tenderness.     Labs:   Recent Results (from the past 24 hour(s))   CBC W/O DIFF    Collection Time: 23 10:11 AM   Result Value Ref Range    WBC 8.8 3.6 - 11.0 K/uL    RBC 3.87 3.80 - 5.20 M/uL    HGB 10.9 (L) 11.5 - 16.0 g/dL    HCT 35.4 35.0 - 47.0 %    MCV 91.5 80.0 - 99.0 FL    MCH 28.2 26.0 - 34.0 PG    MCHC 30.8 30.0 - 36.5 g/dL    RDW 13.6 11.5 - 14.5 %    PLATELET 785 730 - 657 K/uL    MPV 10.0 8.9 - 12.9 FL    NRBC 0.0 0  WBC    ABSOLUTE NRBC 0.00 0.00 - 0.01 K/uL   TYPE & SCREEN    Collection Time: 23 10:11 AM   Result Value Ref Range    Crossmatch Expiration 2023,2359     ABO/Rh(D) A POSITIVE     Antibody screen NEG    CBC WITH AUTOMATED DIFF    Collection Time: 23  5:54 AM   Result Value Ref Range    WBC 16.6 (H) 3.6 - 11.0 K/uL    RBC 3.60 (L) 3.80 - 5.20 M/uL    HGB 10.4 (L) 11.5 - 16.0 g/dL    HCT 33.7 (L) 35.0 - 47.0 %    MCV 93.6 80.0 - 99.0 FL    MCH 28.9 26.0 - 34.0 PG    MCHC 30.9 30.0 - 36.5 g/dL    RDW 13.3 11.5 - 14.5 %    PLATELET 735 089 - 283 K/uL    MPV 10.5 8.9 - 12.9 FL    NRBC 0.0 0  WBC    ABSOLUTE NRBC 0.00 0.00 - 0.01 K/uL    NEUTROPHILS 71 32 - 75 %    LYMPHOCYTES 18 12 - 49 %    MONOCYTES 11 5 - 13 %    EOSINOPHILS 0 0 - 7 %    BASOPHILS 0 0 - 1 %    IMMATURE GRANULOCYTES 0 0.0 - 0.5 %    ABS. NEUTROPHILS 11.6 (H) 1.8 - 8.0 K/UL    ABS. LYMPHOCYTES 3.0 0.8 - 3.5 K/UL    ABS. MONOCYTES 1.9 (H) 0.0 - 1.0 K/UL    ABS. EOSINOPHILS 0.1 0.0 - 0.4 K/UL    ABS. BASOPHILS 0.0 0.0 - 0.1 K/UL    ABS. IMM. GRANS. 0.1 (H) 0.00 - 0.04 K/UL    DF AUTOMATED         Assessment and Plan:  Patient appears to be having uncomplicated post- course. Continue routine post-op care and maternal education. POD#1 s/p RLTCS  -meeting post op goals thus far. Needs to void again by noon.   -post operative hemoglobin stable   -breast feeding  -desires early discharge. We can revisit this later this afternoon        Jessi Beltran MD, [address] [facility]

## 2023-04-20 NOTE — DISCHARGE SUMMARY
Obstetrical Discharge Summary     Name: Jose Justice MRN: [id number]  SSN: [id number]    YOB: 2000  Age: 20 y.o. Sex: female      Allergies: Patient has no known allergies. Admit Date: 2023    Discharge Date: 2023     Admitting Physician: Pee Garland MD     Attending Physician:  Debora Guerra MD     * Admission Diagnoses: Pregnancy [Z34.90]    * Discharge Diagnoses:   Information for the patient's :  Anita Mak [[id number]]   Delivery of a 7 lb 15.3 oz (3.61 kg) female infant via , Low Transverse on 2023 at 1:49 PM  by Pee Garland. Apgars were 9  and 9 . Additional Diagnoses:   Hospital Problems as of 2023 Date Reviewed: 2023            Codes Class Noted - Resolved POA    Pregnancy ICD-10-CM: Z34.90  ICD-9-CM: V22.2  2022 - Present Unknown    Overview Addendum 3/14/2023  2:19 PM by Debora Guerra MD     REPEAT C/S 2023  Primary Provider: Teetee Azevedo ; Sab 2021 twins @10wks, C/S 2022  EDC by: ultrasound at 15w6d   Social:  -Flora Betancourt [address] [facility] labs: records released singed. A positive. AFP neg. Mahin Blevins (after great grandmother)  Genetic Screening:  NIPT NL at [facility]. MSAFP collected 2022 negative  Anatomy: WNL   GTT: 23 passed   Flu:22  TDAP:__  GBS:  PP plans: breastfeeding.  Nexplanon     Problem List  1)Prior CSx1  Short interval pregnancy   -repeat scheduled                Lab Results   Component Value Date/Time    ABO/Rh(D) A POSITIVE 2023 10:11 AM    Rubella, External Immune - 16.4 2021 12:00 AM    GrBStrep, External Negative 2022 12:00 AM    ABO,Rh A Positive 2021 12:00 AM      Immunization History   Administered Date(s) Administered    Influenza, FLUARIX, FLULAVAL, FLUZONE (age 10 mo+) AND AFLURIA, (age 1 y+), PF, 0.5mL 2022       * Procedures: RLTCS  Procedure(s):   SECTION           * Discharge Condition: stable    * Hospital Course: Normal hospital course following the delivery. Patient insistent on early discharge. She does not have anyone to care for her child tomorrow. * Disposition: Home    Discharge Medications:   Current Discharge Medication List          * Follow-up Care/Patient Instructions: Activity: Activity as tolerated and No lifting, Driving, or Strenuous exercise for 2 weeks. No sex for six weeks.    Diet: Regular Diet  Wound Care: Keep wound clean and dry    Follow-up Information    None

## 2023-04-20 NOTE — PROGRESS NOTES
Pain  Service, Anesthesia Post LSCS  Note:    Patient POD-  1, S/P LSCS under spinal with duramorph     Cardiovascular Function/Vital Signs  Visit Vitals  /69 (BP 1 Location: Right upper arm, BP Patient Position: Sitting)   Pulse 87   Temp 36.9 °C (98.5 °F)   Resp 16   Ht 5' 4\" (1.626 m)   Wt 90.3 kg (199 lb)   SpO2 98%   Breastfeeding Unknown   BMI 34.16 kg/m²       Nausea/Vomiting: none    Postoperative hydration adequate    Pain:  Pain Scale 1: Numeric (0 - 10) (04/20/23 1417)  Pain Intensity 1: 8 (04/20/23 1417)   Pain managed     No numbness, weakness, headache or fever .   Spinal/epidural site clean    Plan  Continue current Pain  Regime   No anesthesia complication

## 2023-04-20 NOTE — LACTATION NOTE
This note was copied from a baby's chart. Initial Lactation Consultation - baby born vaginally today to a  mom at 43 weeks gestation. Mom states she breast fed her first child for 7 months and only stopped because she got pregnant with this baby. Mom states this baby has been latching and nursing well. Feeding Plan: Mother will keep baby skin to skin as often as possible, feed on demand, respond to feeding cues, obtain latch, listen for audible swallowing, be aware of signs of oxytocin release/ cramping, thirst and sleepiness while breastfeeding. Mom will not limit the time the baby is at the breast. She will offer both breasts at each feeding.

## 2023-04-21 NOTE — DISCHARGE INSTRUCTIONS
POSTPARTUM DISCHARGE INSTRUCTIONS       Name:  Nacho Casillas  YOB: 2000  Admission Diagnosis:  Pregnancy [Z34.90]     Discharge Diagnosis:  [unfilled]  Attending Physician:  [unfilled]    Delivery Type:   Section: What to Expect at Home    Your Recovery:  A  section, or , is surgery to deliver your baby through a cut, called an incision that the doctor makes in your lower belly and uterus. You may have some pain in your lower belly and need pain medicine for 1 to 2 weeks. You can expect some vaginal bleeding for several weeks. You will probably need about 6 weeks to fully recover. It is important to take it easy while the incision is healing. Avoid heavy lifting, strenuous activities, or exercises that strain the belly muscles while you are recovering. Ask a family member or friend for help with housework, cooking, and shopping. This care sheet gives you a general idea about how long it will take for you to recover. But each person recovers at a different pace. Follow the steps below to get better as quickly as possible. How can you care for yourself at home? Activity  Rest when you feel tired. Getting enough sleep will help you recover. Try to walk each day. Start by walking a little more than you did the day before. Bit by bit, increase the amount you walk. Walking boosts blood flow and helps prevent pneumonia, constipation, and blood clots. Avoid strenuous activities, such as bicycle riding, jogging, weightlifting, and aerobic exercise, for 6 weeks or until your doctor says it is okay. Until your doctor says it is okay, do not lift anything heavier than your baby. Do not do sit-ups or other exercise that strain the belly muscles for 6 weeks or until your doctor says it is okay. Hold a pillow over your incision when you cough or take deep breaths. This will support your belly and decrease your pain.   You may shower as usual. Pat the incision dry when you are done.  You will have some vaginal bleeding. Wear sanitary pads. Do not douche or use tampons until your doctor says it is okay. Ask your doctor when you can drive again. You will probably need to take at least 6 weeks off work. It depends on the type of work you do and how you feel. Wait until you are healed (about 4 to 6 weeks) before you have sexual intercourse. Your doctor will tell you when it is okay to have sex. Talk to your doctor about birth control. You can get pregnant even before your period returns. Also, you can get pregnant while you are breast-feeding. Incision care  Your skin is your body's first line of defense against germs, but an incision site leaves an easy way for germs to enter your body. To prevent infection:  Clean your hands frequently and before and after changing any touching any dressings. If you have strips of tape on the incision, leave the tape on for a week or until it falls off. Look at your incision closely every day for any changes. Contact your doctor if you experience any signs of infection, such as fever or increased redness at the surgical site. Wash the area daily with warm, soapy water, and pat it dry. Don't use hydrogen peroxide or alcohol, which can slow healing. You may cover the area with a gauze bandage if it weeps or rubs against clothing. Change the bandage every day. Keep the area clean and dry. Diet  You can eat your normal diet. If your stomach is upset, try bland, low-fat foods like plain rice, broiled chicken, toast, and yogurt. Drink plenty of fluids (unless your doctor tells you not to). You may notice that your bowel movements are not regular right after your surgery. This is common. Try to avoid constipation and straining with bowel movements. You may want to take a fiber supplement every day. If you have not had a bowel movement after a couple of days, ask your doctor about taking a mild laxative.   If you are breast-feeding, do not drink any alcohol. Medicines  Take pain medicines exactly as directed. If the doctor gave you a prescription medicine for pain, take it as prescribed. If you are not taking a prescription pain medicine, ask your doctor if you can take an over-the-counter medicine such as acetaminophen (Tylenol), ibuprofen (Advil, Motrin), or naproxen (Aleve), for cramps. Read and follow all instructions on the label. Do not take aspirin, because it can cause more bleeding. Do not take acetaminophen (Tylenol) and other acetaminophen containing medications (i.e. Percocet) at the same time. If you think your pain medicine is making you sick to your stomach: Take your medicine after meals (unless your doctor has told you not to). Ask your doctor for a different pain medicine. If your doctor prescribed antibiotics, take them as directed. Do not stop taking them just because you feel better. You need to take the full course of antibiotics. Mental Health  Many women get the \"baby blues\" during the first few days after childbirth. You may lose sleep, feel irritable, and cry easily. You may feel happy one minute and sad the next. Hormone changes are one cause of these emotional changes. Also, the demands of a new baby, along with visits from relatives or other family needs, add to a mother's stress. The \"baby blues\" often peak around the fourth day. Then they ease up in less than 2 weeks. If your moodiness or anxiety lasts for more than 2 weeks, or if you feel like life is not worth living, you may have postpartum depression. This is different for each mother. Some mothers with serious depression may worry intensely about their infant's well-being. Others may feel distant from their child. Some mothers might even feel that they might harm their baby. A mother may have signs of paranoia, wondering if someone is watching her. With all the changes in your life, you may not know if you are depressed.  Pregnancy sometimes causes changes in how you feel that are similar to the symptoms of depression. Symptoms of depression include:  Feeling sad or hopeless and losing interest in daily activities. These are the most common symptoms of depression. Sleeping too much or not enough. Feeling tired. You may feel as if you have no energy. Eating too much or too little. POSTPARTUM [employer]PPORT [employer]ATIONAL ([employer]I) offers a Warm line; Chat with the Expert phone sessions; Information and Articles about Pregnancy and Postpartum Mood Disorders; Comprehensive List of Free Support Groups; Knowledgeable local coordinators who will offer support, information, and resources; Guide to Resources on [employer]; Calendar of events in the  mood disorders community; Latest News and Research; and [facility] [address] for [employer]. Remember - You are not alone; You are not to blame; With help, you will be well. 3-800-533-PPD(4773). WWW. POSTPARTUM. NET   Writing or talking about death, such as writing suicide notes or talking about guns, knives, or pills. Keep the numbers for these national suicide hotlines: 8-046-191-TALK (5-384.296.1670) and 9-378-LBOGZLC (7-292.402.6249). If you or someone you know talks about suicide or feeling hopeless, get help right away. Other instructions  If you breast-feed your baby, you may be more comfortable while you are healing if you place the baby so that he or she is not resting on your belly. Try tucking your baby under your arm, with his or her body along the side you will be feeding on. Support your baby's upper body with your arm. With that hand you can control your baby's head to bring his or her mouth to your breast. This is sometimes called the football hold. Follow-up care is a key part of your treatment and safety. Be sure to make and go to all appointments, and call your doctor if you are having problems.  It's also a good idea to know your test results and keep a list of the medicines you take.    When should you call for help? Call 911 anytime you think you may need emergency care. For example, call if:  You are thinking of hurting yourself, your baby, or anyone else. You passed out (lost consciousness). You have symptoms of a blood clot in your lung (called a pulmonary embolism). These may include:  Sudden chest pain. Trouble breathing. Coughing up blood. Call your doctor now or seek immediate medical care if:    You have severe vaginal bleeding. You are soaking through a pad each hour for 2 or more hours. Your vaginal bleeding seems to be getting heavier or is still bright red 4 days after delivery. You are dizzy or lightheaded, or you feel like you may faint. You are vomiting or cannot keep fluids down. You have a fever. You have new or more belly pain. You have loose stitches, or your incision comes open. You have symptoms of infection, such as: Increased pain, swelling, warmth, or redness. Red streaks leading from the incision. Pus draining from the incision. A fever  You pass tissue (not just blood). Your vaginal discharge smells bad. Your belly feels tender or full and hard. Your breasts are continuously painful or red. You feel sad, anxious, or hopeless for more than a few days. You have sudden, severe pain in your belly. You have symptoms of a blood clot in your leg (called a deep vein thrombosis), such as:  Pain in your calf, back of the knee, thigh, or groin. Redness and swelling in your leg or groin. You have symptoms of preeclampsia, such as:  Sudden swelling of your face, hands, or feet. New vision problems (such as dimness or blurring). A severe headache. Your blood pressure is higher than it should be or rises suddenly. You have new nausea or vomiting. Watch closely for changes in your health, and be sure to contact your doctor if you have any problems.      Additional Information:  Postpartum Support    PARENTS:  Are you feeling sad or depressed? Is it difficult for you to enjoy yourself? Do you feel more irritable or tense? Do you feel anxious or panicky? Are you having difficulty bonding with your baby? Do you feel as if you are \"out of control\" or \"going crazy\"? Are you worried that you might hurt your baby or yourself? FAMILIES: Do you worry that something is wrong but don't know how to help? Do you think that your partner or spouse is having problems coping? Are you worried that it may never get better? While many women experience some mild mood change or \"the blues\" during or after the birth of a child, 1 in 9 women experience more significant symptoms of depression or anxiety. 1 in 10 Dads become depressed during the first year. Things you can do  Being a good parent includes taking care of yourself. If you take care of yourself, you will be able to take better care of your baby and your family. Talk to a counselor or healthcare provider who has training in  mood and anxiety problems. Learn as much as you can about pregnancy and postpartum depression and anxiety. Get support from family and friends. Ask for help when you need it. Join a support group in your area or online. Keep active by walking, stretching or whatever form of exercise helps you to feel better. Get enough rest and time for yourself. Eat a healthy diet. Don't give up! It may take more than one try to get the right help you need. These are general instructions for a healthy lifestyle:    No smoking/ No tobacco products/ Avoid exposure to second hand smoke    Surgeon General's Warning:  Quitting smoking now greatly reduces serious risk to your health.     Obesity, smoking, and sedentary lifestyle greatly increases your risk for illness    A healthy diet, regular physical exercise & weight monitoring are important for maintaining a healthy lifestyle    Recognize signs and symptoms of STROKE:    F-face looks uneven    A-arms unable to move or move unevenly    S-speech slurred or non-existent    T-time-call 911 as soon as signs and symptoms begin - DO NOT go       back to bed or wait to see if you get better - TIME IS BRAIN. I have had the opportunity to make my options or choices for discharge. I have received and understand these instructions.

## 2023-04-26 ENCOUNTER — OFFICE VISIT (OUTPATIENT)
Dept: OBGYN CLINIC | Age: 23
End: 2023-04-26
Payer: COMMERCIAL

## 2023-04-26 VITALS — SYSTOLIC BLOOD PRESSURE: 112 MMHG | WEIGHT: 189 LBS | BODY MASS INDEX: 32.44 KG/M2 | DIASTOLIC BLOOD PRESSURE: 78 MMHG

## 2023-04-26 DIAGNOSIS — Z98.891 S/P CESAREAN SECTION: ICD-10-CM

## 2023-04-26 DIAGNOSIS — F31.62 BIPOLAR DISORDER, CURRENT EPISODE MIXED, MODERATE (HCC): ICD-10-CM

## 2023-04-26 DIAGNOSIS — Z09 POSTOP CHECK: Primary | ICD-10-CM

## 2023-04-26 PROCEDURE — 0503F POSTPARTUM CARE VISIT: CPT | Performed by: OBSTETRICS & GYNECOLOGY

## 2023-04-26 RX ORDER — CARBAMAZEPINE 200 MG/1
200 TABLET ORAL 2 TIMES DAILY
Qty: 60 TABLET | Refills: 0 | Status: SHIPPED | OUTPATIENT
Start: 2023-04-26

## 2023-04-26 RX ORDER — CEPHALEXIN 500 MG/1
500 CAPSULE ORAL 2 TIMES DAILY
Qty: 14 CAPSULE | Refills: 0 | Status: SHIPPED | OUTPATIENT
Start: 2023-04-26 | End: 2023-05-03

## 2023-04-26 NOTE — PROGRESS NOTES
Postpartum Visit    Karina Hdz is a 20 y.o.  presenting for her postpartum visit. She delivered on 2023 by Dr. Jena Breen ( section, repeat, scar revision) and delivery was uncomplicated. She reports yellow drainage to her incision, as well as an area of redness and warmth to the right side. No subjective fever, but she states she had some of chills  night. No fever or chills since. States she has been taking Tylenol/Motrin for her discomfort. She also endorses an extreme feeling of overwhelm and anxiety. She is tearful in the room. Endorses mood swings and frequent outbursts to her partner. No SI/HI. She has a h/o Bipolar depression and previously did well on a mood stabilizer. Patient is exclusively breastfeedfing. Bleeding - none  Incisional pain - erythema on the right side, warmth, serosanguinous drainage. Mood - overwhelmed, crying, anxiety  Feeding - breastfeeding  Contraception - none        Past Medical History:   Diagnosis Date    Asthma     Hx of multiple concussions     while playing soccer    Hx of streptococcal infection     7 X last year    Psychiatric problem     Bipolar.  Anxiety, Depression       Past Surgical History:   Procedure Laterality Date    HX KNEE ARTHROSCOPY      HX OTHER SURGICAL      Ligament reconstruction in left elbow    HX OTHER SURGICAL      Cyst removal of left ankle    HX TONSILLECTOMY  2018       Family History   Problem Relation Age of Onset    Other Father         incarcerated for drugs and conspiracy to murder    Breast Cancer Paternal Grandmother        Social History     Socioeconomic History    Marital status: SINGLE     Spouse name: Not on file    Number of children: Not on file    Years of education: Not on file    Highest education level: Not on file   Occupational History    Not on file   Tobacco Use    Smoking status: Never     Passive exposure: Never    Smokeless tobacco: Never   Substance and Sexual Activity    Alcohol use: Never Drug use: Never    Sexual activity: Yes     Partners: Male     Birth control/protection: None   Other Topics Concern     Service Not Asked    Blood Transfusions Not Asked    Caffeine Concern Not Asked    Occupational Exposure Not Asked    Hobby Hazards Not Asked    Sleep Concern Not Asked    Stress Concern Not Asked    Weight Concern Not Asked    Special Diet Not Asked    Back Care Not Asked    Exercise Not Asked    Bike Helmet Not Asked    Seat Belt Not Asked    Self-Exams Not Asked   Social History Narrative    Not on file     Social Determinants of Health     Financial Resource Strain: Not on file   Food Insecurity: Not on file   Transportation Needs: Not on file   Physical Activity: Not on file   Stress: Not on file   Social Connections: Not on file   Intimate Partner Violence: Not on file   Housing Stability: Not on file       Current Outpatient Medications   Medication Sig Dispense Refill    acetaminophen (TYLENOL) 500 mg tablet Take 2 Tablets by mouth every eight (8) hours. 100 Tablet 0    ibuprofen (MOTRIN) 800 mg tablet Take 1 Tablet by mouth every eight (8) hours as needed for Pain. 100 Tablet 0    PNV Comb #2-Iron-FA-Omega 3 29-1-400 mg cmpk Take  by mouth. cyclobenzaprine (FLEXERIL) 10 mg tablet Take 0.5 Tablets by mouth three (3) times daily as needed for Muscle Spasm(s). (Patient not taking: Reported on 4/26/2023) 20 Tablet 0    albuterol (PROVENTIL HFA, VENTOLIN HFA, PROAIR HFA) 90 mcg/actuation inhaler Take 2 Puffs by inhalation every four (4) hours as needed for Wheezing. (Patient not taking: Reported on 3/14/2023) 18 g 5    butalbital-acetaminophen-caffeine (FIORICET, ESGIC) -40 mg per tablet Take 1 Tablet by mouth every eight (8) hours as needed.  (Patient not taking: Reported on 4/26/2023)         No Known Allergies    Review of Systems - History obtained from the patient  Constitutional: negative for weight loss, fever, night sweats  HEENT: negative for hearing loss, earache, congestion, snoring, sorethroat  CV: negative for chest pain, palpitations, edema  Resp: negative for cough, shortness of breath, wheezing  GI: negative for change in bowel habits, abdominal pain, black or bloody stools  : negative for frequency, dysuria, hematuria, vaginal discharge  MSK: negative for back pain, joint pain, muscle pain  Breast: negative for breast lumps, nipple discharge, galactorrhea  Skin :negative for itching, rash, hives  Neuro: negative for dizziness, headache, confusion, weakness  Psych: negative for anxiety, depression, change in mood  Heme/lymph: negative for bleeding, bruising, pallor    Physical Exam    Visit Vitals  /78 (BP 1 Location: Right arm, BP Patient Position: Sitting)   Wt 189 lb (85.7 kg)   LMP 2022   Breastfeeding Yes   BMI 32.44 kg/m²         OBGyn Exam      Constitutional  Appearance: tearful     Chest  Respiratory Effort: non-labored breathing    Cardiovascular  Extremities: trace peripheral edema    Gastrointestinal  Abdominal Examination: abdomen non-distended, appropriately tender to palpation, no masses present  Incision: 2 cm of superficial separation of the incision at the midline with some serosanguineous fluid. Very mild erythema on the right-hand side of the incision. No odor. No abnormal drainage. Neurologic/Psychiatric  Mental Status:  Orientation: grossly oriented to person, place and time  Mood and Affect: mood normal, affect appropriate      Assessment/Plan:  Anaid Phoenix IS A 20 y.o.    1. Postop check  - discussed findings in detail. Steri strips replaced over midline portion of incision. Will send abx out of an abundance of caution. 2. S/P  section  3. Bipolar disorder   -Patient feels that she is not doing well and needs medication intervention. No HI or SI. We discussed the RBIA/SE/Pos comp to tegretol and breast feeding. Patient asked to make peds aware of new med start.       Patient will return to office in one week or sooner if needed          Jessi Whipple MD

## 2023-08-10 ENCOUNTER — TELEPHONE (OUTPATIENT)
Age: 23
End: 2023-08-10

## 2023-08-10 NOTE — TELEPHONE ENCOUNTER
Ss patient , patient calling name and  verified. patient stating that she has been prescribed Ativan for breakthrough panic attacks , she is asking if she can safely take this medication and breastfeed.        Please advise Thank you

## 2023-08-10 NOTE — TELEPHONE ENCOUNTER
Called patient verified name and  . pateint advised per below    Elvia Bullard RN  to Me    TB     3:28 PM  Dr Flakito Antonio said that it is fine. Just to try to keep it minimal if she can. From what I looked up, baby will get small amounts of it in breastmilk, but is ok.      Patient verbalized understanding

## 2023-08-14 ENCOUNTER — HOSPITAL ENCOUNTER (OUTPATIENT)
Facility: HOSPITAL | Age: 23
Discharge: HOME | End: 2023-08-17
Payer: COMMERCIAL

## 2023-08-14 DIAGNOSIS — F41.1 GENERALIZED ANXIETY DISORDER WITH PANIC ATTACKS: ICD-10-CM

## 2023-08-14 DIAGNOSIS — F41.0 GENERALIZED ANXIETY DISORDER WITH PANIC ATTACKS: ICD-10-CM

## 2023-08-14 DIAGNOSIS — F34.0 CYCLOTHYMIA: Primary | ICD-10-CM

## 2023-08-14 PROCEDURE — 90792 PSYCH DIAG EVAL W/MED SRVCS: CPT | Performed by: MIDWIFE

## 2023-08-14 RX ORDER — LORAZEPAM 0.5 MG/1
0.5 TABLET ORAL 2 TIMES DAILY
Qty: 60 TABLET | Refills: 0 | Status: SHIPPED | OUTPATIENT
Start: 2023-08-14 | End: 2023-09-13

## 2023-08-14 RX ORDER — PAROXETINE 10 MG/1
TABLET, FILM COATED ORAL
COMMUNITY
Start: 2023-07-28

## 2023-08-14 RX ORDER — LAMOTRIGINE 25 MG/1
50 TABLET ORAL DAILY
Qty: 60 TABLET | Refills: 0 | Status: SHIPPED | OUTPATIENT
Start: 2023-08-21 | End: 2023-09-20

## 2023-08-14 RX ORDER — LAMOTRIGINE 25 MG/1
TABLET ORAL
COMMUNITY
Start: 2023-07-28 | End: 2023-08-14 | Stop reason: SDUPTHER

## 2023-08-31 ENCOUNTER — HOSPITAL ENCOUNTER (OUTPATIENT)
Facility: HOSPITAL | Age: 23
Discharge: HOME | End: 2023-09-03
Payer: COMMERCIAL

## 2023-08-31 DIAGNOSIS — F34.0 CYCLOTHYMIA: ICD-10-CM

## 2023-08-31 DIAGNOSIS — F41.1 GENERALIZED ANXIETY DISORDER WITH PANIC ATTACKS: ICD-10-CM

## 2023-08-31 DIAGNOSIS — F41.0 GENERALIZED ANXIETY DISORDER WITH PANIC ATTACKS: ICD-10-CM

## 2023-08-31 PROCEDURE — 99213 OFFICE O/P EST LOW 20 MIN: CPT | Performed by: MIDWIFE

## 2023-08-31 RX ORDER — LAMOTRIGINE 25 MG/1
50 TABLET ORAL DAILY
Qty: 60 TABLET | Refills: 2 | Status: SHIPPED | OUTPATIENT
Start: 2023-09-15 | End: 2023-12-14

## 2023-08-31 RX ORDER — PAROXETINE 10 MG/1
10 TABLET, FILM COATED ORAL EVERY MORNING
Qty: 30 TABLET | Refills: 2 | Status: SHIPPED | OUTPATIENT
Start: 2023-08-31 | End: 2023-11-29

## 2023-08-31 NOTE — BH NOTE
Examination:        I. Reliability in Providing Information: Good      II. Personal Presentation: Looks stated age; dressed appropriately      III. Motor Activity: Normal       IV. Speech Pattern:  Normal      V. Mood: Upset with Michael       Vl. Eye Contact:  Appropriate       Vll. Affect: Appropriate  for context     VllI. Thought Processes        Thought Process:  goal-directed and logical          Thought Content: No delusions, phobias, obsessions, or compulsions        Hallucinations: None        Suicidal Ideation/Attempts: No         Homicidal Ideation/Attempts: No         Self-harm: No           IX. Cognitive Functions       Orientation Level:  Oriented x4         Neurologic State: Alert         Attention/Concentration: Attentive       Abstract Thinking: Intact        Estimate of Intelligence: Average        Judgment/insight: Good         Memory/concentration: Short/long-term intact                    X.Risks: None evident                 XI. Strengths and Assets Inventory:   Intelligence  Cooperative  Employment status: adequate   Living arrangements: stable  Interests/Hobbies     LAB DATA: no orders placed today     Assessment: Laz Lentz is doing well with her medications and no changes are recommended. She and her farrukhe Claudia Mario are having a rough time and both of them could benefit from individual and couples therapy. DSM 5 Diagnoses:  Postpartum depression, with anxiety (53.0)  Cyclothymia (34.0)     Plan:     Continue paroxetine 10 mg; one month and two refills sent   Continue lamotrigine 50 mg; one month and two refills sent; available on 9/15/23  Continue Ativan 0.5 mg up to TID as needed. Quantity sufficient at home.   Encouraged individual and couples therapy; patient plans to call [facility] for appointments  RTO 6 weeks

## 2023-10-10 ENCOUNTER — HOSPITAL ENCOUNTER (OUTPATIENT)
Facility: HOSPITAL | Age: 23
Discharge: HOME | End: 2023-10-13

## 2023-10-10 DIAGNOSIS — F41.0 GENERALIZED ANXIETY DISORDER WITH PANIC ATTACKS: ICD-10-CM

## 2023-10-10 DIAGNOSIS — F41.1 GENERALIZED ANXIETY DISORDER WITH PANIC ATTACKS: ICD-10-CM

## 2023-10-10 DIAGNOSIS — F34.0 CYCLOTHYMIA: ICD-10-CM
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
## 2023-11-21 ENCOUNTER — HOSPITAL ENCOUNTER (OUTPATIENT)
Facility: HOSPITAL | Age: 23
Discharge: HOME | End: 2023-11-24
Payer: COMMERCIAL

## 2023-11-21 DIAGNOSIS — F41.1 GENERALIZED ANXIETY DISORDER WITH PANIC ATTACKS: Primary | ICD-10-CM

## 2023-11-21 DIAGNOSIS — F41.0 GENERALIZED ANXIETY DISORDER WITH PANIC ATTACKS: Primary | ICD-10-CM

## 2023-11-21 PROCEDURE — 99214 OFFICE O/P EST MOD 30 MIN: CPT | Performed by: MIDWIFE

## 2023-11-21 RX ORDER — PAROXETINE HYDROCHLORIDE 20 MG/1
20 TABLET, FILM COATED ORAL EVERY MORNING
Qty: 30 TABLET | Refills: 2 | Status: SHIPPED | OUTPATIENT
Start: 2023-11-21 | End: 2024-02-19

## 2023-11-21 RX ORDER — LORAZEPAM 0.5 MG/1
0.5 TABLET ORAL EVERY 8 HOURS PRN
Qty: 45 TABLET | Refills: 0 | Status: SHIPPED | OUTPATIENT
Start: 2023-11-21 | End: 2023-12-21

## 2024-02-15 ENCOUNTER — INITIAL PRENATAL (OUTPATIENT)
Age: 24
End: 2024-02-15

## 2024-02-15 VITALS — SYSTOLIC BLOOD PRESSURE: 135 MMHG | WEIGHT: 187 LBS | BODY MASS INDEX: 32.1 KG/M2 | DIASTOLIC BLOOD PRESSURE: 80 MMHG

## 2024-02-15 DIAGNOSIS — Z34.90 ENCOUNTER FOR SUPERVISION OF NORMAL PREGNANCY, ANTEPARTUM, UNSPECIFIED GRAVIDITY: Primary | ICD-10-CM

## 2024-02-15 PROCEDURE — 0500F INITIAL PRENATAL CARE VISIT: CPT | Performed by: OBSTETRICS & GYNECOLOGY

## 2024-02-15 RX ORDER — PROMETHAZINE HYDROCHLORIDE 25 MG/1
25 SUPPOSITORY RECTAL EVERY 6 HOURS PRN
Qty: 30 SUPPOSITORY | Refills: 0 | Status: SHIPPED | OUTPATIENT
Start: 2024-02-15 | End: 2024-02-22

## 2024-02-15 NOTE — PROGRESS NOTES
Canavan's Disease?--no.   8.  Familial Dysautonomia?--no.   9.  Sickle cell disease or trait ()?--no   The patient has not been tested for sickle trait  10.  Hemophilia or other blood disorders?--no.   11.  Muscular dystrophy?--no.  12.  Cystic fibrosis?--no.  13.  Eileen's Chorea?--no.  14.  Mental retardation/autism (if yes was person tested for Fragile X)?--no.   15.  Other inherited genetic or chromosomal disorder?--no.   16.  Maternal metabolic disorder (DM, PKU, etc)?--no.  17.  Patient or FOB with a child with a birth defect not listed above?--no.  17a.  Patient or FOB with a birth defect themselves?--no.   18.  Recurrent pregnancy loss, or stillbirth?--no.  19.  Any medications since LMP other than prenatal vitamins (include vitamins, supplements, OTC meds, drugs, alcohol)?--no.  20.  Any other genetic/environmental exposure to discuss?--no.    Infection History:  1.  Lives with someone with TB or TB exposed?--no.   2.  Patient or partner has history of genital herpes?--no.  3.  Rash or viral illness since LMP?--no.    4.  History of STD (GC, CT, HPV, syphilis, HIV)?--no   5. Other: OTHER?      Past Medical History:   Diagnosis Date    Asthma     Hx of multiple concussions     while playing soccer    Hx of streptococcal infection     7 X last year    Postpartum depression 2023    Posttraumatic stress disorder 2018    Psychiatric problem     Bipolar. Anxiety, Depression     Past Surgical History:   Procedure Laterality Date     SECTION      KNEE ARTHROSCOPY      OTHER SURGICAL HISTORY      Ligament reconstruction in left elbow    OTHER SURGICAL HISTORY      Cyst removal of left ankle    TONSILLECTOMY  2018     Social History     Occupational History    Not on file   Tobacco Use    Smoking status: Never    Smokeless tobacco: Never   Vaping Use    Vaping Use: Never used   Substance and Sexual Activity    Alcohol use: Never    Drug use: Never    Sexual activity: Yes

## 2024-02-16 LAB
BACTERIA SPEC CULT: NORMAL
SERVICE CMNT-IMP: NORMAL

## 2024-02-22 LAB
C TRACH RRNA SPEC QL NAA+PROBE: NEGATIVE
N GONORRHOEA RRNA SPEC QL NAA+PROBE: NEGATIVE
SPECIMEN SOURCE: NORMAL
T VAGINALIS RRNA SPEC QL NAA+PROBE: NEGATIVE

## 2024-02-29 ENCOUNTER — ROUTINE PRENATAL (OUTPATIENT)
Age: 24
End: 2024-02-29

## 2024-02-29 VITALS — BODY MASS INDEX: 32.61 KG/M2 | SYSTOLIC BLOOD PRESSURE: 122 MMHG | WEIGHT: 190 LBS | DIASTOLIC BLOOD PRESSURE: 74 MMHG

## 2024-02-29 DIAGNOSIS — Z34.90 PREGNANCY, UNSPECIFIED GESTATIONAL AGE: Primary | ICD-10-CM

## 2024-02-29 DIAGNOSIS — R11.2 NAUSEA AND VOMITING, UNSPECIFIED VOMITING TYPE: ICD-10-CM

## 2024-02-29 DIAGNOSIS — Z34.81 ENCOUNTER FOR SUPERVISION OF OTHER NORMAL PREGNANCY, FIRST TRIMESTER: ICD-10-CM

## 2024-02-29 PROCEDURE — 0502F SUBSEQUENT PRENATAL CARE: CPT | Performed by: OBSTETRICS & GYNECOLOGY

## 2024-02-29 RX ORDER — PROMETHAZINE HYDROCHLORIDE 25 MG/1
25 SUPPOSITORY RECTAL EVERY 6 HOURS PRN
COMMUNITY

## 2024-02-29 RX ORDER — FAMOTIDINE 20 MG/1
20 TABLET, FILM COATED ORAL 2 TIMES DAILY
Qty: 60 TABLET | Refills: 3 | Status: SHIPPED | OUTPATIENT
Start: 2024-02-29

## 2024-02-29 RX ORDER — PNV NO.95/FERROUS FUM/FOLIC AC 28MG-0.8MG
1 TABLET ORAL DAILY
COMMUNITY

## 2024-02-29 SDOH — ECONOMIC STABILITY: HOUSING INSECURITY
IN THE LAST 12 MONTHS, WAS THERE A TIME WHEN YOU DID NOT HAVE A STEADY PLACE TO SLEEP OR SLEPT IN A SHELTER (INCLUDING NOW)?: NO

## 2024-02-29 SDOH — ECONOMIC STABILITY: FOOD INSECURITY: WITHIN THE PAST 12 MONTHS, THE FOOD YOU BOUGHT JUST DIDN'T LAST AND YOU DIDN'T HAVE MONEY TO GET MORE.: NEVER TRUE

## 2024-02-29 SDOH — ECONOMIC STABILITY: INCOME INSECURITY: HOW HARD IS IT FOR YOU TO PAY FOR THE VERY BASICS LIKE FOOD, HOUSING, MEDICAL CARE, AND HEATING?: NOT HARD AT ALL

## 2024-02-29 SDOH — ECONOMIC STABILITY: TRANSPORTATION INSECURITY
IN THE PAST 12 MONTHS, HAS LACK OF TRANSPORTATION KEPT YOU FROM MEETINGS, WORK, OR FROM GETTING THINGS NEEDED FOR DAILY LIVING?: NO

## 2024-02-29 SDOH — ECONOMIC STABILITY: FOOD INSECURITY: WITHIN THE PAST 12 MONTHS, YOU WORRIED THAT YOUR FOOD WOULD RUN OUT BEFORE YOU GOT MONEY TO BUY MORE.: NEVER TRUE

## 2024-02-29 NOTE — PROGRESS NOTES
Patient here for return OB visit at 10w4d. She reports doing better.  Nausea has improved with rectal Phenergan.  She denies vaginal bleeding, loss of fluid, abnormal vaginal discharge, UTI symptoms, cramping, or contractions.     since last visit she has been throwing up stomach acid 2-3 times a night. Currently on promethazine supp.Has also been having heart burn- taking pepcid and tums      /74   Wt 86.2 kg (190 lb)   LMP 2023 (Exact Date)   BMI 32.61 kg/m²             Patient Active Problem List    Diagnosis Date Noted    Postpartum depression 2023    Generalized anxiety disorder with panic attacks 2023    Pregnancy 2022     Primary Provider: Alejandra Rasheed for delivery     EDC by: US/LMP  Social: oldest daughter,john. - Michael TATUM labs: A POSITIVE  Genetic Screening:Panoroma desires 24 Horizon____  Anatomy:  3rd TM labs: GTT___ Hgb___ Plts___  Flu:__ TDAP:__  Rhogam: A POSITIVE     GBS:  Labor plan:   -Epidural   Postpartum  -Breast/Bottle   -Circ  -Pap      Problem List:  1)Prior CSx2  2) Short interval pregnancy   3) Cough variant asthma  4) anxiety/depression         Posttraumatic stress disorder 2018    Cyclothymia 2018    Social phobia 2018    Asthma, cough variant 2012       Return in about 4 weeks (around 3/28/2024).    Charlene Rasheed MD

## 2024-03-01 LAB
ABO + RH BLD: NORMAL
ALBUMIN SERPL-MCNC: 3.4 G/DL (ref 3.5–5)
ALBUMIN/GLOB SERPL: 0.9 (ref 1.1–2.2)
ALP SERPL-CCNC: 50 U/L (ref 45–117)
ALT SERPL-CCNC: 14 U/L (ref 12–78)
ANION GAP SERPL CALC-SCNC: 7 MMOL/L (ref 5–15)
AST SERPL-CCNC: 9 U/L (ref 15–37)
BILIRUB SERPL-MCNC: 0.4 MG/DL (ref 0.2–1)
BLOOD BANK CMNT PATIENT-IMP: NORMAL
BLOOD GROUP ANTIBODIES SERPL: NORMAL
BUN SERPL-MCNC: 6 MG/DL (ref 6–20)
BUN/CREAT SERPL: 10 (ref 12–20)
CALCIUM SERPL-MCNC: 9.1 MG/DL (ref 8.5–10.1)
CHLORIDE SERPL-SCNC: 106 MMOL/L (ref 97–108)
CO2 SERPL-SCNC: 23 MMOL/L (ref 21–32)
CREAT SERPL-MCNC: 0.63 MG/DL (ref 0.55–1.02)
ERYTHROCYTE [DISTWIDTH] IN BLOOD BY AUTOMATED COUNT: 12.5 % (ref 11.5–14.5)
FERRITIN SERPL-MCNC: 20 NG/ML (ref 26–388)
GLOBULIN SER CALC-MCNC: 3.7 G/DL (ref 2–4)
GLUCOSE SERPL-MCNC: 116 MG/DL (ref 65–100)
HBV SURFACE AG SER QL: 0.66 INDEX
HBV SURFACE AG SER QL: NEGATIVE
HCT VFR BLD AUTO: 37.3 % (ref 35–47)
HCV AB SER IA-ACNC: 0.07 INDEX
HCV AB SERPL QL IA: NONREACTIVE
HGB BLD-MCNC: 12.7 G/DL (ref 11.5–16)
HIV 1+2 AB+HIV1 P24 AG SERPL QL IA: NONREACTIVE
HIV 1/2 RESULT COMMENT: NORMAL
MCH RBC QN AUTO: 32.2 PG (ref 26–34)
MCHC RBC AUTO-ENTMCNC: 34 G/DL (ref 30–36.5)
MCV RBC AUTO: 94.4 FL (ref 80–99)
NRBC # BLD: 0 K/UL (ref 0–0.01)
NRBC BLD-RTO: 0 PER 100 WBC
PLATELET # BLD AUTO: 257 K/UL (ref 150–400)
PMV BLD AUTO: 10.5 FL (ref 8.9–12.9)
POTASSIUM SERPL-SCNC: 3.8 MMOL/L (ref 3.5–5.1)
PROT SERPL-MCNC: 7.1 G/DL (ref 6.4–8.2)
RBC # BLD AUTO: 3.95 M/UL (ref 3.8–5.2)
RUBV IGG SERPL IA-ACNC: NORMAL IU/ML
SODIUM SERPL-SCNC: 136 MMOL/L (ref 136–145)
SPECIMEN EXP DATE BLD: NORMAL
WBC # BLD AUTO: 10.1 K/UL (ref 3.6–11)

## 2024-03-02 LAB — VZV IGG SER IA-ACNC: <135 INDEX

## 2024-03-04 LAB — T PALLIDUM AB SER QL IA: NON REACTIVE

## 2024-03-06 LAB
HGB A MFR BLD: 97.1 % (ref 96.4–98.8)
HGB A2 MFR BLD COLUMN CHROM: 2.9 % (ref 1.8–3.2)
HGB F MFR BLD: 0 % (ref 0–2)
HGB FRACT BLD-IMP: NORMAL
HGB S MFR BLD: 0 %

## 2024-03-28 ENCOUNTER — ROUTINE PRENATAL (OUTPATIENT)
Age: 24
End: 2024-03-28

## 2024-03-28 VITALS — SYSTOLIC BLOOD PRESSURE: 120 MMHG | DIASTOLIC BLOOD PRESSURE: 74 MMHG | BODY MASS INDEX: 31.24 KG/M2 | WEIGHT: 182 LBS

## 2024-03-28 DIAGNOSIS — Z34.90 PREGNANCY, UNSPECIFIED GESTATIONAL AGE: Primary | ICD-10-CM

## 2024-03-28 NOTE — PROGRESS NOTES
Patient here for return OB visit at 14w4d. She reports no concerns or complaints.  She reports good fetal movement.   She denies vaginal bleeding, loss of fluid, abnormal vaginal discharge, UTI symptoms, cramping, or contractions.       /74   Wt 82.6 kg (182 lb)   LMP 2023 (Exact Date)   BMI 31.24 kg/m²             Patient Active Problem List    Diagnosis Date Noted    Postpartum depression 2023    Generalized anxiety disorder with panic attacks 2023    Pregnancy 2022     Primary Provider: Alejandra Rasheed for delivery     EDC by: US/LMP  Social: oldest daughter,john. Luisa - Michael   IOB labs: A POSITIVE  Genetic Screening:Panoroma desires 24 Horizon____ GIRL Angie   Anatomy:  3rd TM labs: GTT___ Hgb___ Plts___  Flu:__ TDAP:__  Rhogam: A POSITIVE     GBS:  Labor plan:   -Epidural   Postpartum  -Breast  -Pap  - BC: nexplanon     Problem List:  1)Prior CSx2  2) Short interval pregnancy   3) Cough variant asthma  4) anxiety/depression         Posttraumatic stress disorder 2018    Cyclothymia 2018    Social phobia 2018    Asthma, cough variant 2012       Return for 18 week visit + 20 wk anatomy + 24. 28 + glucola .    Charlene Rasheed MD

## 2024-04-25 ENCOUNTER — ROUTINE PRENATAL (OUTPATIENT)
Age: 24
End: 2024-04-25

## 2024-04-25 VITALS — DIASTOLIC BLOOD PRESSURE: 72 MMHG | BODY MASS INDEX: 30.73 KG/M2 | SYSTOLIC BLOOD PRESSURE: 112 MMHG | WEIGHT: 179 LBS

## 2024-04-25 DIAGNOSIS — Z34.90 PREGNANCY, UNSPECIFIED GESTATIONAL AGE: Primary | ICD-10-CM

## 2024-04-25 PROCEDURE — 0502F SUBSEQUENT PRENATAL CARE: CPT | Performed by: OBSTETRICS & GYNECOLOGY

## 2024-04-25 NOTE — PROGRESS NOTES
+FM  Vomiting in the morning. Hip pain / round ligament .   C/o kayce lantigua every day 1x/hr.   Declines MSAFP today

## 2024-04-25 NOTE — PROGRESS NOTES
Patient here for return OB visit at 18w3d. She reports rond ligament pain. Rare kayce lantigua. Acid reflux.   Discussed belly band.   Discussed antacids   She reports good fetal movement.   She denies vaginal bleeding, loss of fluid, abnormal vaginal discharge, UTI symptoms, cramping, or contractions.       /72   Wt 81.2 kg (179 lb)   LMP 2023 (Exact Date)   BMI 30.73 kg/m²    Cervix:  FH:             Patient Active Problem List    Diagnosis Date Noted    Postpartum depression 2023    Generalized anxiety disorder with panic attacks 2023    Pregnancy 2022     Primary Provider: Alejandra Rasheed for delivery     EDC by: US/LMP  Social: oldest daughter,john. Luisa - Michael   IOB labs: A POSITIVE  Genetic Screening:Panoroma desires 24 Horizon____ GIRL Angie   Anatomy:  3rd TM labs: GTT___ Hgb___ Plts___  Flu:__ TDAP:__  Rhogam: A POSITIVE     GBS:  Labor plan:   -Epidural   Postpartum  -Breast  -Pap  - BC: nexplanon     Problem List:  1)Prior CSx2  2) Short interval pregnancy   3) Cough variant asthma  4) anxiety/depression         Posttraumatic stress disorder 2018    Cyclothymia 2018    Social phobia 2018    Asthma, cough variant 2012       Return for , , 32 (please look MIGUEL A on US) .    Charlene Rasheed MD

## 2024-05-02 ENCOUNTER — TELEPHONE (OUTPATIENT)
Age: 24
End: 2024-05-02

## 2024-05-02 NOTE — TELEPHONE ENCOUNTER
I have called and spoken to the pt, name and  verified. Patient had called into the on call provider c/o yellow discharge. Pt reports that it started this morning and was opaque in color and about the size of a dime with a gelatin consistency. Pt denies any blood or pink tinge, as well as no pain, cramping or LOF. We discussed the increase in discharge as pregnancy progresses. She was told to monitor her sx and if any pain or cramping were to arise then to call us back, otherwise an increase in discharge is normal in pregnancy. Pt also reports that she has not seen it again since early this morning. Please let me know if there are any messages to relay to the pt. She is currently a  and 19w4d. Age is 24. Pt denies any itching or foul odor.

## 2024-05-08 NOTE — PROGRESS NOTES
Doing well  +FM  Declines LOF, bleeding/spotting, cramping.     ULTRASOUND TODAY 05/09/2024:  FETAL SURVEY A SINGLE VIABLE IUP AT 20W4D IS SEEN. FETAL CARDIAC MOTION OBSERVED. FETAL ANATOMY WAS WELL VISUALIZED AND APPEARS WNL. NO ABNORMALITIES WERE SEEN ON TODAYS EXAM. APPROPRIATE GROWTH MEASURED. SIZE = DATES. COSME, PLACENTA AND CERVIX APPEAR WITHIN NORMAL LIMITS. GENDER: FEMALE

## 2024-05-09 ENCOUNTER — ROUTINE PRENATAL (OUTPATIENT)
Age: 24
End: 2024-05-09

## 2024-05-09 VITALS — SYSTOLIC BLOOD PRESSURE: 128 MMHG | WEIGHT: 176.6 LBS | DIASTOLIC BLOOD PRESSURE: 84 MMHG | BODY MASS INDEX: 30.31 KG/M2

## 2024-05-09 DIAGNOSIS — Z34.90 PREGNANCY, UNSPECIFIED GESTATIONAL AGE: Primary | ICD-10-CM

## 2024-05-09 PROCEDURE — 0502F SUBSEQUENT PRENATAL CARE: CPT | Performed by: OBSTETRICS & GYNECOLOGY

## 2024-05-09 NOTE — PROGRESS NOTES
Patient here for return OB visit at 20w4d. She reports no concerns.  She reports good fetal movement.   She denies vaginal bleeding, loss of fluid, abnormal vaginal discharge, UTI symptoms, cramping, or contractions.       Talked about her weight   She is eating lots of fruits and veggies.  Discussed protein shakes   Has a little aversion to meat right now       Discussed AC on anatomy US  Will repeat growth in 4 weeks       ULTRASOUND TODAY 2024:  FETAL SURVEY A SINGLE VIABLE IUP AT 20W4D IS SEEN. FETAL CARDIAC MOTION OBSERVED. FETAL ANATOMY WAS WELL VISUALIZED AND APPEARS WNL. NO ABNORMALITIES WERE SEEN ON TODAYS EXAM. APPROPRIATE GROWTH MEASURED. SIZE = DATES. COSME, PLACENTA AND CERVIX APPEAR WITHIN NORMAL LIMITS. GENDER: FEMALE    Placenta Location Anterior 3 V.Cord Yes    /84   Wt 80.1 kg (176 lb 9.6 oz)   LMP 2023 (Exact Date)   BMI 30.31 kg/m²        Prenatal Fetal Information  Fetal HR: u/s today  Movement: Present      Patient Active Problem List    Diagnosis Date Noted    Postpartum depression 2023    Generalized anxiety disorder with panic attacks 2023    Pregnancy 2022     Primary Provider: Alejandra Rasheed for delivery     EDC by: US/LMP  Social: oldest daughter,john. Luisa - Michael   IOB labs: Hep B neg.Hep C neg. RPR neg. HIV neg. Varicella nonimmune /Rubella immune A POSITIVE  Genetic Screening:Panoroma desires 24 Horizon declinedGIRL [place] \"Eileen\", MSAFP declined  Anatomy:  3rd TM labs: GTT___ Hgb___ Plts___  Flu:__ TDAP:__  Rhogam: A POSITIVE     GBS:  Labor plan:   -Epidural   Postpartum  -Breast  -Pap  - BC: nexplanon     Problem List:  1)Prior CSx2  2) Short interval pregnancy   3) Cough variant asthma  4) anxiety/depression         Posttraumatic stress disorder 2018    Cyclothymia 2018    Social phobia 2018    Asthma, cough variant 2012       Return for please add growth US at 24 weeks for AC at anatomy of

## 2024-06-06 ENCOUNTER — HOSPITAL ENCOUNTER (OUTPATIENT)
Facility: HOSPITAL | Age: 24
Discharge: HOME | End: 2024-06-06
Attending: EMERGENCY MEDICINE | Admitting: OBSTETRICS & GYNECOLOGY
Payer: COMMERCIAL

## 2024-06-06 ENCOUNTER — TELEPHONE (OUTPATIENT)
Age: 24
End: 2024-06-06

## 2024-06-06 VITALS
DIASTOLIC BLOOD PRESSURE: 60 MMHG | HEART RATE: 80 BPM | RESPIRATION RATE: 17 BRPM | TEMPERATURE: 98.5 F | OXYGEN SATURATION: 98 % | SYSTOLIC BLOOD PRESSURE: 123 MMHG

## 2024-06-06 DIAGNOSIS — Z3A.24 PREGNANCY WITH 24 COMPLETED WEEKS GESTATION: Primary | ICD-10-CM

## 2024-06-06 PROCEDURE — 99202 OFFICE O/P NEW SF 15 MIN: CPT

## 2024-06-06 NOTE — H&P
Department of Obstetrics and Gynecology  Attending Obstetrics History and Physical        CHIEF COMPLAINT:  pelvic pain    HISTORY OF PRESENT ILLNESS:      The patient is a 24 y.o.  4 parity 2 at 24 and 4/7 weeks.  Patient presents with a chief complaint of sharp BLQ/pelvic pain, L>>R, made worse with sitting up, walking, and getting in and out of car. No vaginal bleeding,ROM,uterine contractions, or decreased FM. [facility] EMR reviewed from 2/15/24 through 24.    DATES:    Estimated Due Date:  24    PRENATAL CARE:    Provider:  Linette ANGEL    Blood Type/Rh:  A +  Gonorrhea:  Neg  Chlamydia:  Neg        PAST OB HISTORY        Depression:  Yes       Post-partum depression:  No      Diabetes:  No      Gestational Diabetes:  No      Thyroid Disease:  No      Chronic HTN:  No      Gestation HTN:  No      Pre-eclampsia:  No      Seizure disorder:  No      Asthma:  No      Clotting disorder:  No      :  Yes x 2      Tubal ligation:  No      D & C:  No      Cerclage:  No      LEEP:  No      Myomectomy:  No    OB History    Para Term  AB Living   4 2 2   1 2   SAB IAB Ectopic Molar Multiple Live Births   1       1 2      # Outcome Date GA Lbr Reilly/2nd Weight Sex Delivery Anes PTL Lv   4 Current            3 Term 21 39w0d  3.61 kg (7 lb 15.3 oz) F CS-LTranv Spinal N BI   2 Term 20 39w3d  2.825 kg (6 lb 3.7 oz) F CS-LTranv OTHER N BI      Complications: Failure to Progress in First Stage, Fetal Intolerance   1A SAB 2021 10w0d          1B SAB 2021 10w0d              Past Medical History:        Diagnosis Date    Asthma     Hx of multiple concussions     while playing soccer    Hx of streptococcal infection     7 X last year    Postpartum depression 2023    Posttraumatic stress disorder 2018    Psychiatric problem     Bipolar. Anxiety, Depression     Past Surgical History:        Procedure Laterality Date     SECTION      KNEE ARTHROSCOPY      OTHER

## 2024-06-06 NOTE — PROGRESS NOTES
NST:      Baseline: 150    Variability: Moderate    Accelerations: Age appropriate 10 BPM    Decelerations: None    Contractions: None    Cat 1, NDNST    This test was preformed under my supervision and interpreted by me.    Gerson Mahmood M.D.

## 2024-06-06 NOTE — PROGRESS NOTES
Dana Merrill is a 24 y.o.  at 24w4d patient of  Dr. Chaney at [facility]  who presents to L&D triage with c/o lower pelvic pain radiating to knees. She reports Positive FM, denies vaginal bleeding, LOF, and contractions. She also denies Headaches, Scotoma, RUQ pain, and Edema. Urine sample obtained. EFM and toco placed for initial assessment.      1640: Dr. Mahmood at bedside for assessment. SVE performed by MD with pt consent.     9272 Patient education provided regarding discharge. Opportunity given for patient to ask questions all questions answered appropriately. Patient ambulated off unit. Patient remains remains pregnant and plans to deliver at [place].     No Vaccines Administered.

## 2024-06-06 NOTE — DISCHARGE INSTRUCTIONS
Keep scheduled appointment with Dr. Chaney.          Weeks 22 to 26 of Your Pregnancy: Care Instructions  Your baby's lungs are getting ready for breathing. Your baby may respond to your voice. Your baby likely turns less, and kicks or jerks more. Jerking may mean that your baby has hiccups.    Think about taking childbirth classes. And start to think about whether you want to have pain medicine during labor.   At your next doctor visit, you may be tested for anemia and for high blood sugar that first occurs during pregnancy (gestational diabetes). These conditions can cause problems for you and your baby.         To ease discomfort, such as back pain   Change your position often. Try not to sit or stand for too long.  Get some exercise. Things like walking or stretching may help.  Try using a heating pad or cold pack.        To ease or reduce swelling in your feet, ankles, hands, and fingers   Take off your rings.  Avoid high-sodium foods, such as potato chips.  Prop up your feet, and sleep with pillows under your feet.  Try to avoid standing for long periods of time.  Do not wear tight shoes.  Wear support stockings.  Kegel exercises to prevent urine from leaking    Squeeze your muscles as if you were trying not to pass gas. Your belly, legs, and buttocks shouldn't move. Hold the squeeze for 3 seconds, then relax for 5 to 10 seconds.    Add 1 second each week until you can squeeze for 10 seconds. Repeat the exercise 10 times a session. Do 3 to 8 sessions a day. If these exercises cause you pain, stop doing them and talk with your doctor.  Follow-up care is a key part of your treatment and safety. Be sure to make and go to all appointments, and call your doctor if you are having problems. It's also a good idea to know your test results and keep a list of the medicines you take.  Where can you learn more?  Go to https://www.healthwise.net/patientEd and enter G264 to learn more about \"Weeks 22 to 26 of Your  stretching exercises that may help.  Try a heating pad or cold pack on the area. A warm bath or shower may also help.  Rest when you can.  Ask your doctor about taking acetaminophen for pain. Be safe with medicines. Read and follow all instructions on the label.  Try a belly support band. Some people find that these can help.  When should you call for help?   Call your doctor now or seek immediate medical care if:    You think you might be in labor.     You have new or worse pain.   Watch closely for changes in your health, and be sure to contact your doctor if you have any problems.  Where can you learn more?  Go to https://www.[employer].net/patientEd and enter R110 to learn more about \"Round Ligament Pain: Care Instructions.\"  Current as of: July 10, 2023  Content Version: 14.1  © 2568-2295 [employer].   Care instructions adapted under license by [employer]. If you have questions about a medical condition or this instruction, always ask your healthcare professional. [employer] disclaims any warranty or liability for your use of this information.

## 2024-06-06 NOTE — TELEPHONE ENCOUNTER
Kathya pt  Patient called, name and  verified. Patient is calling c/o hip pain that we know is not round ligament pain. She reports the pain as achy and has been here for a while but has gotten significantly worse. She has taken Tylenol with no relief. She has an appt scheduled with her provider on  and reports good FM. I have discussed with the pt being seen at the closest hospital to her which is in [facility], she is over an hour away from our office. She is currently a  and 24w4d. Age is 24. Pt denies any LOF or bleeding.

## 2024-06-11 ENCOUNTER — ROUTINE PRENATAL (OUTPATIENT)
Age: 24
End: 2024-06-11

## 2024-06-11 VITALS — DIASTOLIC BLOOD PRESSURE: 82 MMHG | SYSTOLIC BLOOD PRESSURE: 120 MMHG | BODY MASS INDEX: 31.07 KG/M2 | WEIGHT: 181 LBS

## 2024-06-11 DIAGNOSIS — Z34.90 PREGNANCY, UNSPECIFIED GESTATIONAL AGE: Primary | ICD-10-CM

## 2024-06-11 PROCEDURE — 0502F SUBSEQUENT PRENATAL CARE: CPT | Performed by: OBSTETRICS & GYNECOLOGY

## 2024-06-11 NOTE — PROGRESS NOTES
Patient here for return OB visit at 25w2d. She reports a significant amount of hip pain.  Discussed PT and chiropractor   Wearing a belly band   She reports good fetal movement.   She denies vaginal bleeding, loss of fluid, abnormal vaginal discharge, UTI symptoms, cramping, or contractions.       /82   Wt 82.1 kg (181 lb)   LMP 2023 (Exact Date)   BMI 31.07 kg/m²          Prenatal Fetal Information  Fetal HR: U/S TODAY  Movement: Present      Patient Active Problem List    Diagnosis Date Noted    Postpartum depression 2023    Generalized anxiety disorder with panic attacks 2023    Pregnancy 2022     Primary Provider: Alejandra Rasheed for delivery     EDC by: US/LMP  Social: oldest daughter,john. Luisa - Michael   IOB labs: Hep B neg.Hep C neg. RPR neg. HIV neg. Varicella nonimmune /Rubella immune A POSITIVE  Genetic Screening:Panoroma desires 24 Horizon declinedGIRL Sundance \"Eileen\", MSAFP declined  Anatomy:  A SINGLE VIABLE IUP AT 20W4D IS SEEN. FETAL CARDIAC MOTION OBSERVED. FETAL ANATOMY WAS WELL VISUALIZED AND APPEARS WNL. NO ABNORMALITIES WERE SEEN ON TODAYS EXAM. APPROPRIATE GROWTH MEASURED. SIZE = DATES. COSME, PLACENTA AND CERVIX APPEAR WITHIN NORMAL LIMITS. GENDER: FEMALE  AC11%tile   3rd TM labs: GTT___ Hgb___ Plts___  Flu:__ TDAP:__  Rhogam: A POSITIVE     GBS:  Labor plan:   -Epidural   Postpartum  -Breast  -Pap  - BC: nexplanon     Problem List:  1)Prior CSx2  2) Short interval pregnancy   3) Cough variant asthma  4) anxiety/depression         Posttraumatic stress disorder 2018    Cyclothymia 2018    Social phobia 2018    Asthma, cough variant 2012       Return in about 3 weeks (around 2024).    Charlene Rasheed MD

## 2024-06-11 NOTE — PROGRESS NOTES
Dana Merrill is 25w2d   Doing well  +FM  Denies LOF, bleeding/spotting, cramping, headache, blurred vision, edema, or RUQ pain.     ULTRASOUND TODAY 06/11/2024:  LIMITED OB SCAN A SINGLE VERTEX 25W2D IUP IS SEEN. FETAL CARDIAC MOTION OBSERVED. LIMITED ANATOMY WAS VISUALIZED AND APPEARS WNL. EFW= 1 LB 14 OZ ( 55.8 %) COSME= 14.11 CM PLACENTA APPEARS WITHIN NORMAL LIMITS.

## 2024-06-20 ENCOUNTER — TELEPHONE (OUTPATIENT)
Age: 24
End: 2024-06-20

## 2024-06-20 NOTE — TELEPHONE ENCOUNTER
I have called and spoken to the pt to inform her that I have been trying to send the fax requested since we got off the phone The line is busy and I am unable to send this. She reports that she will call them and send us a "[employer] message to relay the new fax number.

## 2024-06-20 NOTE — TELEPHONE ENCOUNTER
Patient called in as she was referred to PT and has fond her own facility. She is asking that we send the referral to [facility]. The fax number provided to me is: [phone].    The order has been faxed to this number.

## 2024-07-03 ENCOUNTER — TELEPHONE (OUTPATIENT)
Age: 24
End: 2024-07-03

## 2024-07-08 ENCOUNTER — PATIENT MESSAGE (OUTPATIENT)
Age: 24
End: 2024-07-08

## 2024-07-08 DIAGNOSIS — Z34.90 PREGNANCY, UNSPECIFIED GESTATIONAL AGE: Primary | ICD-10-CM

## 2024-07-08 NOTE — TELEPHONE ENCOUNTER
Referral sent to new location per pt request.     From: Dana Merrill  To: Dr. Charlene Rasheed  Sent: 7/8/2024  7:14 AM EDT  Subject: PT referral     Good morning, is there anyway I can get a new referral with [facility] [place]? I just noticed that the referral has a different place on it. Thank you

## 2024-07-11 ENCOUNTER — ROUTINE PRENATAL (OUTPATIENT)
Age: 24
End: 2024-07-11
Payer: COMMERCIAL

## 2024-07-11 ENCOUNTER — LAB (OUTPATIENT)
Age: 24
End: 2024-07-11

## 2024-07-11 VITALS — WEIGHT: 182 LBS | DIASTOLIC BLOOD PRESSURE: 82 MMHG | BODY MASS INDEX: 31.24 KG/M2 | SYSTOLIC BLOOD PRESSURE: 122 MMHG

## 2024-07-11 DIAGNOSIS — Z34.90 PREGNANCY, UNSPECIFIED GESTATIONAL AGE: ICD-10-CM

## 2024-07-11 DIAGNOSIS — Z34.90 PREGNANCY, UNSPECIFIED GESTATIONAL AGE: Primary | ICD-10-CM

## 2024-07-11 LAB
BLOOD BANK CMNT PATIENT-IMP: NORMAL
BLOOD GROUP ANTIBODIES SERPL: NORMAL
ERYTHROCYTE [DISTWIDTH] IN BLOOD BY AUTOMATED COUNT: 12.8 % (ref 11.5–14.5)
GLUCOSE 1H P 100 G GLC PO SERPL-MCNC: 98 MG/DL (ref 65–140)
HCT VFR BLD AUTO: 34.9 % (ref 35–47)
HGB BLD-MCNC: 11.3 G/DL (ref 11.5–16)
MCH RBC QN AUTO: 32.3 PG (ref 26–34)
MCHC RBC AUTO-ENTMCNC: 32.4 G/DL (ref 30–36.5)
MCV RBC AUTO: 99.7 FL (ref 80–99)
NRBC # BLD: 0 K/UL (ref 0–0.01)
NRBC BLD-RTO: 0 PER 100 WBC
PLATELET # BLD AUTO: 236 K/UL (ref 150–400)
PMV BLD AUTO: 10 FL (ref 8.9–12.9)
RBC # BLD AUTO: 3.5 M/UL (ref 3.8–5.2)
WBC # BLD AUTO: 10.8 K/UL (ref 3.6–11)

## 2024-07-11 PROCEDURE — 0502F SUBSEQUENT PRENATAL CARE: CPT | Performed by: OBSTETRICS & GYNECOLOGY

## 2024-07-11 PROCEDURE — 90471 IMMUNIZATION ADMIN: CPT | Performed by: OBSTETRICS & GYNECOLOGY

## 2024-07-11 PROCEDURE — 90715 TDAP VACCINE 7 YRS/> IM: CPT | Performed by: OBSTETRICS & GYNECOLOGY

## 2024-07-11 NOTE — PROGRESS NOTES
Danajethro Merrill is 29w4d   Doing well  +FM  Denies LOF, bleeding/spotting, cramping, headache, blurred vision, edema, or RUQ pain.     Tdap today

## 2024-07-11 NOTE — PROGRESS NOTES
Patient here for return OB visit at 29w3d. She reports no concerns.  Tdap today   She reports good fetal movement.   She denies vaginal bleeding, loss of fluid, abnormal vaginal discharge, UTI symptoms, cramping, or contractions.       /82   Wt 82.6 kg (182 lb)   LMP 2023 (Exact Date)   BMI 31.24 kg/m²      FH:29             Patient Active Problem List    Diagnosis Date Noted    Postpartum depression 2023    Generalized anxiety disorder with panic attacks 2023    Pregnancy 2022     Primary Provider: Alejandra Rasheed for delivery     EDC by: US/LMP  Social: oldest daughter,john. Luisa - Michael   IOB labs: Hep B neg.Hep C neg. RPR neg. HIV neg. Varicella nonimmune /Rubella immune A POSITIVE  Genetic Screening:Panoroma desires 24 Horizon declinedGIRL Fairborn \"Eileen\", MSAFP declined  Anatomy:  A SINGLE VIABLE IUP AT 20W4D IS SEEN. FETAL CARDIAC MOTION OBSERVED. FETAL ANATOMY WAS WELL VISUALIZED AND APPEARS WNL. NO ABNORMALITIES WERE SEEN ON TODAYS EXAM. APPROPRIATE GROWTH MEASURED. SIZE = DATES. COSME, PLACENTA AND CERVIX APPEAR WITHIN NORMAL LIMITS. GENDER: FEMALE  AC11%tile   3rd TM labs: GTT___ Hgb___ Plts___  Flu:__ TDAP: 24  Rhogam: A POSITIVE     GBS:  Labor plan:   -Epidural   Postpartum  -Breast  -Pap  - BC: nexplanon     Problem List:  1)Prior CSx2  2) Short interval pregnancy   3) Cough variant asthma  4) anxiety/depression         Posttraumatic stress disorder 2018    Cyclothymia 2018    Social phobia 2018    Asthma, cough variant 2012       Return for add on growth US for poor weight gain in pregnancy and short interval pregnancy .    Charlene Rasheed MD

## 2024-07-13 LAB — T PALLIDUM AB SER QL IA: NON REACTIVE

## 2024-07-25 ENCOUNTER — ROUTINE PRENATAL (OUTPATIENT)
Age: 24
End: 2024-07-25

## 2024-07-25 VITALS — BODY MASS INDEX: 30.38 KG/M2 | DIASTOLIC BLOOD PRESSURE: 82 MMHG | SYSTOLIC BLOOD PRESSURE: 118 MMHG | WEIGHT: 177 LBS

## 2024-07-25 DIAGNOSIS — R63.4 WEIGHT LOSS: ICD-10-CM

## 2024-07-25 DIAGNOSIS — Z34.90 PREGNANCY, UNSPECIFIED GESTATIONAL AGE: Primary | ICD-10-CM

## 2024-07-25 PROCEDURE — 0502F SUBSEQUENT PRENATAL CARE: CPT | Performed by: OBSTETRICS & GYNECOLOGY

## 2024-07-25 RX ORDER — BUSPIRONE HYDROCHLORIDE 5 MG/1
5 TABLET ORAL 3 TIMES DAILY
Qty: 90 TABLET | Refills: 0 | Status: SHIPPED | OUTPATIENT
Start: 2024-07-25 | End: 2024-08-24

## 2024-07-25 NOTE — PROGRESS NOTES
Dana Merrill is 31w4d   Doing well  +FM  Denies LOF, bleeding/spotting, cramping, headache, blurred vision, edema, or RUQ pain.   Started physical therapy - doing great!!   Reports lost best friend after last ultrasound - today is a very emotional day for her , requesting medication for support.    LIMITED OB SCAN A SINGLE VERTEX 31W4D IUP IS SEEN. FETAL CARDIAC MOTION OBSERVED. LIMITED ANATOMY WAS VISUALIZED AND APPEARS WNL. EFW= 3LB 8 OZ ( 32 %) COSME= 13.5 CM PLACENTA APPEARS WITHIN NORMAL LIMITS.

## 2024-07-25 NOTE — PROGRESS NOTES
Patient here for return OB visit at 31w4d.   Losing weight and feeling tired but she is eating.   Denies depression.     She reports good fetal movement.   She denies vaginal bleeding, loss of fluid, abnormal vaginal discharge, UTI symptoms, cramping, or contractions.       /82   Wt 80.3 kg (177 lb)   LMP 2023 (Exact Date)   BMI 30.38 kg/m²        Prenatal Fetal Information  Fetal HR: u/s today  Movement: Present      Patient Active Problem List    Diagnosis Date Noted    Postpartum depression 2023    Generalized anxiety disorder with panic attacks 2023    Pregnancy 2022     Primary Provider: Alejandra Rasheed for delivery     EDC by: US/LMP  Social: oldest daughter,john. Luisa - Michael   IOB labs: Hep B neg.Hep C neg. RPR neg. HIV neg. Varicella nonimmune /Rubella immune A POSITIVE  Genetic Screening:Panoroma desires 24 Horizon declinedGIRL Jackson \"Eileen\", MSAFP declined  Anatomy:  A SINGLE VIABLE IUP AT 20W4D IS SEEN. FETAL CARDIAC MOTION OBSERVED. FETAL ANATOMY WAS WELL VISUALIZED AND APPEARS WNL. NO ABNORMALITIES WERE SEEN ON TODAYS EXAM. APPROPRIATE GROWTH MEASURED. SIZE = DATES. COSME, PLACENTA AND CERVIX APPEAR WITHIN NORMAL LIMITS. GENDER: FEMALE  AC11%tile   3rd TM labs: GTT 98 Hgb 11.3 Plts 236  Flu:__ TDAP: 24  Rhogam: A POSITIVE     GBS:  Labor plan:   -Epidural   Postpartum  -Breast  -Pap  - BC: nexplanon     Problem List:  1)Prior Csx2  -repeat on 24  2) Short interval pregnancy   3) Cough variant asthma  4) anxiety/depression         Posttraumatic stress disorder 2018    Cyclothymia 2018    Social phobia 2018    Asthma, cough variant 2012       Return in about 2 weeks (around 2024).    Charlene Rasheed MD

## 2024-07-26 LAB
25(OH)D3 SERPL-MCNC: 17.2 NG/ML (ref 30–100)
ALBUMIN SERPL-MCNC: 3.1 G/DL (ref 3.5–5)
ALBUMIN/GLOB SERPL: 0.8 (ref 1.1–2.2)
ALP SERPL-CCNC: 57 U/L (ref 45–117)
ALT SERPL-CCNC: 12 U/L (ref 12–78)
ANION GAP SERPL CALC-SCNC: 8 MMOL/L (ref 5–15)
AST SERPL-CCNC: 8 U/L (ref 15–37)
BILIRUB SERPL-MCNC: 0.4 MG/DL (ref 0.2–1)
BUN SERPL-MCNC: 9 MG/DL (ref 6–20)
BUN/CREAT SERPL: 16 (ref 12–20)
CALCIUM SERPL-MCNC: 9.3 MG/DL (ref 8.5–10.1)
CHLORIDE SERPL-SCNC: 106 MMOL/L (ref 97–108)
CO2 SERPL-SCNC: 23 MMOL/L (ref 21–32)
CREAT SERPL-MCNC: 0.55 MG/DL (ref 0.55–1.02)
GLOBULIN SER CALC-MCNC: 4 G/DL (ref 2–4)
GLUCOSE SERPL-MCNC: 86 MG/DL (ref 65–100)
POTASSIUM SERPL-SCNC: 3.6 MMOL/L (ref 3.5–5.1)
PROT SERPL-MCNC: 7.1 G/DL (ref 6.4–8.2)
SODIUM SERPL-SCNC: 137 MMOL/L (ref 136–145)

## 2024-07-26 RX ORDER — MELATONIN
1000 DAILY
Qty: 30 TABLET | Refills: 4 | Status: SHIPPED | OUTPATIENT
Start: 2024-07-26

## 2024-07-27 LAB — TSH SERPL DL<=0.05 MIU/L-ACNC: 0.81 UIU/ML (ref 0.45–4.5)

## 2024-08-08 ENCOUNTER — ROUTINE PRENATAL (OUTPATIENT)
Age: 24
End: 2024-08-08

## 2024-08-08 VITALS
DIASTOLIC BLOOD PRESSURE: 68 MMHG | RESPIRATION RATE: 16 BRPM | HEART RATE: 88 BPM | HEIGHT: 63 IN | BODY MASS INDEX: 31.71 KG/M2 | WEIGHT: 179 LBS | SYSTOLIC BLOOD PRESSURE: 112 MMHG

## 2024-08-08 DIAGNOSIS — R53.83 OTHER FATIGUE: Primary | ICD-10-CM

## 2024-08-08 LAB — FERRITIN SERPL-MCNC: 5 NG/ML (ref 8–252)

## 2024-08-08 PROCEDURE — 0502F SUBSEQUENT PRENATAL CARE: CPT | Performed by: OBSTETRICS & GYNECOLOGY

## 2024-08-08 ASSESSMENT — PATIENT HEALTH QUESTIONNAIRE - PHQ9
1. LITTLE INTEREST OR PLEASURE IN DOING THINGS: NOT AT ALL
2. FEELING DOWN, DEPRESSED OR HOPELESS: NOT AT ALL
SUM OF ALL RESPONSES TO PHQ QUESTIONS 1-9: 0
10. IF YOU CHECKED OFF ANY PROBLEMS, HOW DIFFICULT HAVE THESE PROBLEMS MADE IT FOR YOU TO DO YOUR WORK, TAKE CARE OF THINGS AT HOME, OR GET ALONG WITH OTHER PEOPLE: NOT DIFFICULT AT ALL
3. TROUBLE FALLING OR STAYING ASLEEP: NOT AT ALL
SUM OF ALL RESPONSES TO PHQ QUESTIONS 1-9: 0
SUM OF ALL RESPONSES TO PHQ9 QUESTIONS 1 & 2: 0
SUM OF ALL RESPONSES TO PHQ QUESTIONS 1-9: 0
9. THOUGHTS THAT YOU WOULD BE BETTER OFF DEAD, OR OF HURTING YOURSELF: NOT AT ALL
7. TROUBLE CONCENTRATING ON THINGS, SUCH AS READING THE NEWSPAPER OR WATCHING TELEVISION: NOT AT ALL
8. MOVING OR SPEAKING SO SLOWLY THAT OTHER PEOPLE COULD HAVE NOTICED. OR THE OPPOSITE, BEING SO FIGETY OR RESTLESS THAT YOU HAVE BEEN MOVING AROUND A LOT MORE THAN USUAL: NOT AT ALL
5. POOR APPETITE OR OVEREATING: NOT AT ALL
4. FEELING TIRED OR HAVING LITTLE ENERGY: NOT AT ALL
SUM OF ALL RESPONSES TO PHQ QUESTIONS 1-9: 0
6. FEELING BAD ABOUT YOURSELF - OR THAT YOU ARE A FAILURE OR HAVE LET YOURSELF OR YOUR FAMILY DOWN: NOT AT ALL

## 2024-08-08 NOTE — PROGRESS NOTES
Patient here for return OB visit at 33w4d. She reports no concerns.    She reports good fetal movement.   She denies vaginal bleeding, loss of fluid, abnormal vaginal discharge, UTI symptoms, cramping, or contractions.       /68   Pulse 88   Resp 16   Ht 1.6 m (5' 3\")   Wt 81.2 kg (179 lb)   LMP 2023 (Exact Date)   BMI 31.71 kg/m²    Cervix:  FH:31      Prenatal Fetal Information  Fetal HR: 155  Movement: Present      Patient Active Problem List    Diagnosis Date Noted    Postpartum depression 2023    Generalized anxiety disorder with panic attacks 2023    Pregnancy 2022     Primary Provider: Alejandra Rasheed for delivery     EDC by: US/LMP  Social: oldest daughter,john. Luisa - Michael   IOB labs: Hep B neg.Hep C neg. RPR neg. HIV neg. Varicella nonimmune /Rubella immune A POSITIVE  Genetic Screening:Panoroma desires 24 Horizon declinedGIRL [place] \"Eileen\", MSAFP declined  Anatomy:  A SINGLE VIABLE IUP AT 20W4D IS SEEN. FETAL CARDIAC MOTION OBSERVED. FETAL ANATOMY WAS WELL VISUALIZED AND APPEARS WNL. NO ABNORMALITIES WERE SEEN ON TODAYS EXAM. APPROPRIATE GROWTH MEASURED. SIZE = DATES. COSME, PLACENTA AND CERVIX APPEAR WITHIN NORMAL LIMITS. GENDER: FEMALE  AC11%tile   3rd TM labs: GTT 98 Hgb 11.3 Plts 236  Flu:__ TDAP: 24  Rhogam: A POSITIVE     GBS:  Labor plan:   -Epidural   Postpartum  -Breast  -Pap  - BC: nexplanon     Problem List:  1)Prior Csx2  -repeat on 24  2) Short interval pregnancy   3) Cough variant asthma  4) anxiety/depression         Posttraumatic stress disorder 2018    Cyclothymia 2018    Social phobia 2018    Asthma, cough variant 2012       Return in about 2 weeks (around 2024) for short interval pregnancy and poor weight gain .    Charlene Rasheed MD

## 2024-08-08 NOTE — PROGRESS NOTES
Dana Merrill is 33w4d   Doing well  +FM  Denies LOF, bleeding/spotting, cramping, headache, blurred vision, edema, or RUQ pain.

## 2024-08-13 PROBLEM — D50.9 IRON DEFICIENCY ANEMIA: Status: ACTIVE | Noted: 2024-08-13

## 2024-08-13 RX ORDER — SODIUM CHLORIDE 9 MG/ML
5-250 INJECTION, SOLUTION INTRAVENOUS PRN
Status: CANCELLED | OUTPATIENT
Start: 2024-08-13

## 2024-08-13 RX ORDER — HEPARIN 100 UNIT/ML
500 SYRINGE INTRAVENOUS PRN
Status: CANCELLED | OUTPATIENT
Start: 2024-08-13

## 2024-08-19 ENCOUNTER — HOSPITAL ENCOUNTER (OUTPATIENT)
Facility: HOSPITAL | Age: 24
Setting detail: INFUSION SERIES
Discharge: HOME | End: 2024-08-19
Payer: COMMERCIAL

## 2024-08-19 VITALS
SYSTOLIC BLOOD PRESSURE: 103 MMHG | RESPIRATION RATE: 16 BRPM | OXYGEN SATURATION: 98 % | WEIGHT: 182.4 LBS | BODY MASS INDEX: 32.32 KG/M2 | TEMPERATURE: 98.3 F | DIASTOLIC BLOOD PRESSURE: 60 MMHG | HEIGHT: 63 IN | HEART RATE: 77 BPM

## 2024-08-19 DIAGNOSIS — D50.9 IRON DEFICIENCY ANEMIA, UNSPECIFIED IRON DEFICIENCY ANEMIA TYPE: Primary | ICD-10-CM

## 2024-08-19 PROCEDURE — 96374 THER/PROPH/DIAG INJ IV PUSH: CPT

## 2024-08-19 PROCEDURE — 2580000003 HC RX 258: Performed by: OBSTETRICS & GYNECOLOGY

## 2024-08-19 PROCEDURE — 6360000002 HC RX W HCPCS: Performed by: OBSTETRICS & GYNECOLOGY

## 2024-08-19 RX ORDER — ACETAMINOPHEN 325 MG/1
650 TABLET ORAL
Status: DISCONTINUED | OUTPATIENT
Start: 2024-08-19 | End: 2024-08-20 | Stop reason: HOSPADM

## 2024-08-19 RX ORDER — HEPARIN 100 UNIT/ML
500 SYRINGE INTRAVENOUS PRN
Status: CANCELLED | OUTPATIENT
Start: 2024-08-21

## 2024-08-19 RX ORDER — DIPHENHYDRAMINE HYDROCHLORIDE 50 MG/ML
50 INJECTION INTRAMUSCULAR; INTRAVENOUS
Status: CANCELLED | OUTPATIENT
Start: 2024-08-21

## 2024-08-19 RX ORDER — SODIUM CHLORIDE 9 MG/ML
INJECTION, SOLUTION INTRAVENOUS CONTINUOUS
Status: DISCONTINUED | OUTPATIENT
Start: 2024-08-19 | End: 2024-08-20 | Stop reason: HOSPADM

## 2024-08-19 RX ORDER — SODIUM CHLORIDE 9 MG/ML
5-250 INJECTION, SOLUTION INTRAVENOUS PRN
Status: DISCONTINUED | OUTPATIENT
Start: 2024-08-19 | End: 2024-08-20 | Stop reason: HOSPADM

## 2024-08-19 RX ORDER — ONDANSETRON 2 MG/ML
8 INJECTION INTRAMUSCULAR; INTRAVENOUS
Status: CANCELLED | OUTPATIENT
Start: 2024-08-21

## 2024-08-19 RX ORDER — ACETAMINOPHEN 325 MG/1
650 TABLET ORAL
Status: CANCELLED | OUTPATIENT
Start: 2024-08-21

## 2024-08-19 RX ORDER — SODIUM CHLORIDE 9 MG/ML
5-250 INJECTION, SOLUTION INTRAVENOUS PRN
Status: CANCELLED | OUTPATIENT
Start: 2024-08-21

## 2024-08-19 RX ORDER — ALBUTEROL SULFATE 90 UG/1
4 AEROSOL, METERED RESPIRATORY (INHALATION) PRN
Status: DISCONTINUED | OUTPATIENT
Start: 2024-08-19 | End: 2024-08-20 | Stop reason: HOSPADM

## 2024-08-19 RX ORDER — SODIUM CHLORIDE 9 MG/ML
INJECTION, SOLUTION INTRAVENOUS CONTINUOUS
Status: CANCELLED | OUTPATIENT
Start: 2024-08-21

## 2024-08-19 RX ORDER — EPINEPHRINE 1 MG/ML
0.3 INJECTION, SOLUTION, CONCENTRATE INTRAVENOUS PRN
Status: CANCELLED | OUTPATIENT
Start: 2024-08-21

## 2024-08-19 RX ORDER — DIPHENHYDRAMINE HYDROCHLORIDE 50 MG/ML
50 INJECTION INTRAMUSCULAR; INTRAVENOUS
Status: DISCONTINUED | OUTPATIENT
Start: 2024-08-19 | End: 2024-08-20 | Stop reason: HOSPADM

## 2024-08-19 RX ORDER — ONDANSETRON 2 MG/ML
8 INJECTION INTRAMUSCULAR; INTRAVENOUS
Status: DISCONTINUED | OUTPATIENT
Start: 2024-08-19 | End: 2024-08-20 | Stop reason: HOSPADM

## 2024-08-19 RX ORDER — SODIUM CHLORIDE 0.9 % (FLUSH) 0.9 %
5-40 SYRINGE (ML) INJECTION PRN
Status: CANCELLED | OUTPATIENT
Start: 2024-08-21

## 2024-08-19 RX ORDER — SODIUM CHLORIDE 0.9 % (FLUSH) 0.9 %
5-40 SYRINGE (ML) INJECTION PRN
Status: DISCONTINUED | OUTPATIENT
Start: 2024-08-19 | End: 2024-08-20 | Stop reason: HOSPADM

## 2024-08-19 RX ORDER — ALBUTEROL SULFATE 90 UG/1
4 AEROSOL, METERED RESPIRATORY (INHALATION) PRN
Status: CANCELLED | OUTPATIENT
Start: 2024-08-21

## 2024-08-19 RX ORDER — EPINEPHRINE 1 MG/ML
0.3 INJECTION, SOLUTION, CONCENTRATE INTRAVENOUS PRN
Status: DISCONTINUED | OUTPATIENT
Start: 2024-08-19 | End: 2024-08-20 | Stop reason: HOSPADM

## 2024-08-19 RX ADMIN — IRON SUCROSE 200 MG: 20 INJECTION, SOLUTION INTRAVENOUS at 13:54

## 2024-08-19 RX ADMIN — SODIUM CHLORIDE 100 ML/HR: 9 INJECTION, SOLUTION INTRAVENOUS at 13:43

## 2024-08-19 NOTE — DISCHARGE INSTRUCTIONS
iron sucrose (injection)  Pronunciation:  EYE urn NAHUM goodson  Brand:  Venofer  What is the most important information I should know about iron sucrose?  Follow all directions on your medicine label and package. Tell each of your healthcare providers about all your medical conditions, allergies, and all medicines you use.  What is iron sucrose?  Iron sucrose is used to treat iron deficiency anemia in people with kidney disease.  Iron sucrose is for use in adults and children at least 2 years old.  Iron sucrose is not for treating other forms of anemia not caused by iron deficiency.   Iron sucrose may also be used for purposes not listed in this medication guide.  What should I discuss with my healthcare provider before I receive iron sucrose?  You should not be treated with this medicine if you have ever had an allergic reaction to an iron injection.  Tell your doctor if you have ever had:  hemochromatosis or iron overload (the buildup of excess iron).  Tell your doctor if you are pregnant or plan to become pregnant. Iron sucrose can harm an unborn baby if you have a severe reaction to this medicine during your second or third trimester. However, not treating iron deficiency anemia during pregnancy may cause complications such as premature birth or low birth weight. The benefit of treating your condition during pregnancy may outweigh any risks.  If you are breastfeeding, tell your doctor if you notice diarrhea or constipation in the nursing baby.  How is iron sucrose given?  Iron sucrose is given as an infusion into a vein. A healthcare provider will give you this injection.  This medicine is sometimes given slowly, and the infusion can take up to 2.5 hours to complete.  Tell your caregivers if you feel any burning, pain, or swelling around the IV needle when iron sucrose is injected.  You will be watched closely for at least 30 minutes to make sure you do not have an allergic reaction.  You will need frequent  not complete. Other drugs may affect iron sucrose, including prescription and over-the-counter medicines, vitamins, and herbal products. Not all possible drug interactions are listed here.  Where can I get more information?  Your doctor or pharmacist can provide more information about iron sucrose injection.  Remember, keep this and all other medicines out of the reach of children, never share your medicines with others, and use this medication only for the indication prescribed.   Every effort has been made to ensure that the information provided by [employer] is accurate, up-to-date, and complete, but no guarantee is made to that effect. Drug information contained herein may be time sensitive. [employer] information has been compiled for use by healthcare practitioners and consumers in the [place] and therefore [employer] does not warrant that uses outside of the [place] are appropriate, unless specifically indicated otherwise. [employer]'s drug information does not endorse drugs, diagnose patients or recommend therapy. [employer] drug information is an informational resource designed to assist licensed healthcare practitioners in caring for their patients and/or to serve consumers viewing this service as a supplement to, and not a substitute for, the expertise, skill, knowledge and judgment of healthcare practitioners. The absence of a warning for a given drug or drug combination in no way should be construed to indicate that the drug or drug combination is safe, effective or appropriate for any given patient. [employer] does not assume any responsibility for any aspect of healthcare administered with the aid of information [employer] provides. The information contained herein is not intended to cover all possible uses, directions, precautions, warnings, drug interactions, allergic reactions, or adverse effects. If you have questions about the drugs you are taking, check with your doctor, nurse or

## 2024-08-19 NOTE — PROGRESS NOTES
[facility] Progress Note    Date: 2024    Name: Dana Merrill    MRN: [id number]         : 2000      Ms. Merrill was assessed and education was provided. Pt arrived ambulatory, 35 weeks pregnant and reported no concerns. IV established in the right AC, brisk blood return. NS initiated.     Ms. Merrill's vitals were reviewed and patient was observed for 5 minutes prior to treatment.     Vitals:    24 1405   BP: (!) 116/55   Pulse: 79   Resp: 16   Temp: 98.4 °F (36.9 °C)   SpO2: 98%     Venofer 200 mg given IVP over 10 minutes. Infusion complete and patient without concerns. NS flushing line and stopped. Pt observed for 30 minutes post infusion and without complaint.     Ms. Merrill tolerated the infusion, and had no complaints.  Patient armband removed and shredded.    [facility]ill [facility] condition at 1441. She is to return on  at 1300 for her next appointment.    Millie Wilson RN  2024  2:19 PM

## 2024-08-21 ENCOUNTER — HOSPITAL ENCOUNTER (OUTPATIENT)
Facility: HOSPITAL | Age: 24
Setting detail: INFUSION SERIES
Discharge: HOME | End: 2024-08-21
Payer: COMMERCIAL

## 2024-08-21 VITALS
OXYGEN SATURATION: 99 % | WEIGHT: 180.2 LBS | DIASTOLIC BLOOD PRESSURE: 59 MMHG | SYSTOLIC BLOOD PRESSURE: 116 MMHG | RESPIRATION RATE: 16 BRPM | HEART RATE: 89 BPM | BODY MASS INDEX: 31.93 KG/M2 | TEMPERATURE: 98.6 F

## 2024-08-21 DIAGNOSIS — D50.9 IRON DEFICIENCY ANEMIA, UNSPECIFIED IRON DEFICIENCY ANEMIA TYPE: Primary | ICD-10-CM

## 2024-08-21 PROCEDURE — 2580000003 HC RX 258: Performed by: OBSTETRICS & GYNECOLOGY

## 2024-08-21 PROCEDURE — 96374 THER/PROPH/DIAG INJ IV PUSH: CPT

## 2024-08-21 PROCEDURE — 6360000002 HC RX W HCPCS: Performed by: OBSTETRICS & GYNECOLOGY

## 2024-08-21 RX ORDER — ACETAMINOPHEN 325 MG/1
650 TABLET ORAL
Status: DISCONTINUED | OUTPATIENT
Start: 2024-08-21 | End: 2024-08-22 | Stop reason: HOSPADM

## 2024-08-21 RX ORDER — ONDANSETRON 2 MG/ML
8 INJECTION INTRAMUSCULAR; INTRAVENOUS
Status: DISCONTINUED | OUTPATIENT
Start: 2024-08-21 | End: 2024-08-22 | Stop reason: HOSPADM

## 2024-08-21 RX ORDER — ALBUTEROL SULFATE 90 UG/1
4 AEROSOL, METERED RESPIRATORY (INHALATION) PRN
Status: CANCELLED | OUTPATIENT
Start: 2024-08-23

## 2024-08-21 RX ORDER — EPINEPHRINE 1 MG/ML
0.3 INJECTION, SOLUTION, CONCENTRATE INTRAVENOUS PRN
Status: DISCONTINUED | OUTPATIENT
Start: 2024-08-21 | End: 2024-08-22 | Stop reason: HOSPADM

## 2024-08-21 RX ORDER — SODIUM CHLORIDE 9 MG/ML
5-250 INJECTION, SOLUTION INTRAVENOUS PRN
Status: CANCELLED | OUTPATIENT
Start: 2024-08-23

## 2024-08-21 RX ORDER — SODIUM CHLORIDE 9 MG/ML
INJECTION, SOLUTION INTRAVENOUS CONTINUOUS
Status: DISCONTINUED | OUTPATIENT
Start: 2024-08-21 | End: 2024-08-22 | Stop reason: HOSPADM

## 2024-08-21 RX ORDER — SODIUM CHLORIDE 9 MG/ML
5-250 INJECTION, SOLUTION INTRAVENOUS PRN
Status: DISCONTINUED | OUTPATIENT
Start: 2024-08-21 | End: 2024-08-22 | Stop reason: HOSPADM

## 2024-08-21 RX ORDER — SODIUM CHLORIDE 0.9 % (FLUSH) 0.9 %
5-40 SYRINGE (ML) INJECTION PRN
Status: CANCELLED | OUTPATIENT
Start: 2024-08-23

## 2024-08-21 RX ORDER — ACETAMINOPHEN 325 MG/1
650 TABLET ORAL
Status: CANCELLED | OUTPATIENT
Start: 2024-08-23

## 2024-08-21 RX ORDER — DIPHENHYDRAMINE HYDROCHLORIDE 50 MG/ML
50 INJECTION INTRAMUSCULAR; INTRAVENOUS
Status: DISCONTINUED | OUTPATIENT
Start: 2024-08-21 | End: 2024-08-22 | Stop reason: HOSPADM

## 2024-08-21 RX ORDER — SODIUM CHLORIDE 9 MG/ML
INJECTION, SOLUTION INTRAVENOUS CONTINUOUS
Status: CANCELLED | OUTPATIENT
Start: 2024-08-23

## 2024-08-21 RX ORDER — HEPARIN 100 UNIT/ML
500 SYRINGE INTRAVENOUS PRN
Status: CANCELLED | OUTPATIENT
Start: 2024-08-23

## 2024-08-21 RX ORDER — ONDANSETRON 2 MG/ML
8 INJECTION INTRAMUSCULAR; INTRAVENOUS
Status: CANCELLED | OUTPATIENT
Start: 2024-08-23

## 2024-08-21 RX ORDER — DIPHENHYDRAMINE HYDROCHLORIDE 50 MG/ML
50 INJECTION INTRAMUSCULAR; INTRAVENOUS
Status: CANCELLED | OUTPATIENT
Start: 2024-08-23

## 2024-08-21 RX ORDER — ALBUTEROL SULFATE 90 UG/1
4 AEROSOL, METERED RESPIRATORY (INHALATION) PRN
Status: DISCONTINUED | OUTPATIENT
Start: 2024-08-21 | End: 2024-08-22 | Stop reason: HOSPADM

## 2024-08-21 RX ORDER — EPINEPHRINE 1 MG/ML
0.3 INJECTION, SOLUTION, CONCENTRATE INTRAVENOUS PRN
Status: CANCELLED | OUTPATIENT
Start: 2024-08-23

## 2024-08-21 RX ADMIN — SODIUM CHLORIDE 75 ML/HR: 9 INJECTION, SOLUTION INTRAVENOUS at 14:10

## 2024-08-21 RX ADMIN — IRON SUCROSE 200 MG: 20 INJECTION, SOLUTION INTRAVENOUS at 14:20

## 2024-08-21 NOTE — PROGRESS NOTES
[facility] Progress Note    Date: 2024    Name: Dana Merrill    MRN: [id number]         : 2000      Ms. Merrill was assessed and education was provided. She denies new problems and denies adverse effect from prior venofer.    Ms. Merrill's vitals were reviewed and patient was observed for 5 minutes prior to treatment.   Vitals:    24 1358   BP: 121/62   Pulse: 86   Resp: 16   Temp: 98.6 °F (37 °C)   SpO2: 99%   IV started right antecubital without difficulty.   ml IV main line established.    Venofer 200 mg was given IVP over 7 minutes.  Ms. Merrill tolerated the infusion, and had no complaints. She was observed for 30 minutes post administration and remained without adverse effect. VSS. IV removed and site intact with band-aid. Patient armband removed and shredded    Ms. Merrill was [facility] in stable condition at 1505. She is to return on  at 2 pm for her next appointment.    Pippa Moran RN  2024  2:40 PM

## 2024-08-22 ENCOUNTER — ROUTINE PRENATAL (OUTPATIENT)
Age: 24
End: 2024-08-22

## 2024-08-22 VITALS
SYSTOLIC BLOOD PRESSURE: 120 MMHG | DIASTOLIC BLOOD PRESSURE: 62 MMHG | BODY MASS INDEX: 31.1 KG/M2 | WEIGHT: 182.2 LBS | HEART RATE: 72 BPM | HEIGHT: 64 IN

## 2024-08-22 DIAGNOSIS — Z34.90 PREGNANCY, UNSPECIFIED GESTATIONAL AGE: Primary | ICD-10-CM

## 2024-08-22 DIAGNOSIS — O09.893 SHORT INTERVAL BETWEEN PREGNANCIES AFFECTING PREGNANCY IN THIRD TRIMESTER, ANTEPARTUM: ICD-10-CM

## 2024-08-22 DIAGNOSIS — O26.13 LOW WEIGHT GAIN DURING PREGNANCY IN THIRD TRIMESTER: ICD-10-CM

## 2024-08-22 PROCEDURE — 0502F SUBSEQUENT PRENATAL CARE: CPT | Performed by: OBSTETRICS & GYNECOLOGY

## 2024-08-22 NOTE — PROGRESS NOTES
Patient here for return OB visit at 35w4d. She reports no concerns.       ULTRASOUND TODAY 2024:  LIMITED OB SCAN A SINGLE VERTEX 35W4D IUP IS SEEN. FETAL CARDIAC MOTION OBSERVED. LIMITED ANATOMY WAS VISUALIZED AND APPEARS WNL. EFW= 5 LB 1 OZ ( 18.3 %) (AC<10%) BPP= 8/8 COSME= 11.47 CM PLACENTA APPEARS WITHIN NORMAL LIMITS          The ultrasound images were reviewed with the patient. Changes were made to the ultrasound impression as deemed necessary.       Discussed FGR based on AC 7% tile   Rec del 38-39 and weekly BPPs until del       She reports good fetal movement.   She denies vaginal bleeding, loss of fluid, abnormal vaginal discharge, UTI symptoms, cramping, or contractions.       /62   Pulse 72   Ht 1.626 m (5' 4\")   Wt 82.6 kg (182 lb 3.2 oz)   LMP 2023 (Exact Date)   BMI 31.27 kg/m²          Patient Active Problem List    Diagnosis Date Noted    Iron deficiency anemia 2024    Postpartum depression 2023    Generalized anxiety disorder with panic attacks 2023    Pregnancy 2022     Primary Provider: Alejandra Rasheed for delivery     EDC by: US/LMP  Social: oldest daughter,john. Luisa - Michael   IOB labs: Hep B neg.Hep C neg. RPR neg. HIV neg. Varicella nonimmune /Rubella immune A POSITIVE  Genetic Screening:Panoroma desires 24 Horizon declinedGIRL Biddle \"Eileen\", MSAFP declined  Anatomy:  A SINGLE VIABLE IUP AT 20W4D IS SEEN. FETAL CARDIAC MOTION OBSERVED. FETAL ANATOMY WAS WELL VISUALIZED AND APPEARS WNL. NO ABNORMALITIES WERE SEEN ON TODAYS EXAM. APPROPRIATE GROWTH MEASURED. SIZE = DATES. COSME, PLACENTA AND CERVIX APPEAR WITHIN NORMAL LIMITS. GENDER: FEMALE  AC11%tile   3rd TM labs: GTT 98 Hgb 11.3 Plts 236  Flu:__ TDAP: 24  Rhogam: A POSITIVE     GBS:  Labor plan:   -Epidural   Postpartum  -Breast  -Pap  - BC: nexplanon     Problem List:  1)Prior Csx2  -repeat on 24  2) Short interval pregnancy   3) Cough variant asthma  4)

## 2024-08-22 NOTE — PROGRESS NOTES
ULTRASOUND TODAY 08/22/2024:  LIMITED OB SCAN A SINGLE VERTEX 35W4D IUP IS SEEN. FETAL CARDIAC MOTION OBSERVED. LIMITED ANATOMY WAS VISUALIZED AND APPEARS WNL. EFW= 5 LB 1 OZ ( 18.3 %) (AC<10%) BPP= 8/8 COSME= 11.47 CM PLACENTA APPEARS WITHIN NORMAL LIMITS.      Dana Merrill is 35w4d   Doing well  +FM  Denies LOF, bleeding/spotting, cramping, headache, blurred vision, edema, or RUQ pain.

## 2024-08-23 ENCOUNTER — HOSPITAL ENCOUNTER (OUTPATIENT)
Facility: HOSPITAL | Age: 24
Setting detail: INFUSION SERIES
End: 2024-08-23

## 2024-08-26 ENCOUNTER — HOSPITAL ENCOUNTER (OUTPATIENT)
Facility: HOSPITAL | Age: 24
Setting detail: INFUSION SERIES
Discharge: HOME | End: 2024-08-26
Payer: COMMERCIAL

## 2024-08-26 VITALS
OXYGEN SATURATION: 97 % | HEIGHT: 64 IN | BODY MASS INDEX: 31.34 KG/M2 | WEIGHT: 183.6 LBS | HEART RATE: 99 BPM | SYSTOLIC BLOOD PRESSURE: 110 MMHG | DIASTOLIC BLOOD PRESSURE: 58 MMHG | RESPIRATION RATE: 16 BRPM | TEMPERATURE: 98.6 F

## 2024-08-26 DIAGNOSIS — D50.9 IRON DEFICIENCY ANEMIA, UNSPECIFIED IRON DEFICIENCY ANEMIA TYPE: Primary | ICD-10-CM

## 2024-08-26 PROCEDURE — 6360000002 HC RX W HCPCS: Performed by: OBSTETRICS & GYNECOLOGY

## 2024-08-26 PROCEDURE — 2580000003 HC RX 258: Performed by: OBSTETRICS & GYNECOLOGY

## 2024-08-26 PROCEDURE — 96374 THER/PROPH/DIAG INJ IV PUSH: CPT

## 2024-08-26 RX ORDER — SODIUM CHLORIDE 0.9 % (FLUSH) 0.9 %
5-40 SYRINGE (ML) INJECTION PRN
Status: DISCONTINUED | OUTPATIENT
Start: 2024-08-26 | End: 2024-08-27 | Stop reason: HOSPADM

## 2024-08-26 RX ORDER — ONDANSETRON 2 MG/ML
8 INJECTION INTRAMUSCULAR; INTRAVENOUS
Status: CANCELLED | OUTPATIENT
Start: 2024-08-27

## 2024-08-26 RX ORDER — SODIUM CHLORIDE 9 MG/ML
5-250 INJECTION, SOLUTION INTRAVENOUS PRN
Status: CANCELLED | OUTPATIENT
Start: 2024-08-27

## 2024-08-26 RX ORDER — ACETAMINOPHEN 325 MG/1
650 TABLET ORAL
Status: CANCELLED | OUTPATIENT
Start: 2024-08-27

## 2024-08-26 RX ORDER — SODIUM CHLORIDE 0.9 % (FLUSH) 0.9 %
5-40 SYRINGE (ML) INJECTION PRN
Status: CANCELLED | OUTPATIENT
Start: 2024-08-27

## 2024-08-26 RX ORDER — DIPHENHYDRAMINE HYDROCHLORIDE 50 MG/ML
50 INJECTION INTRAMUSCULAR; INTRAVENOUS
Status: CANCELLED | OUTPATIENT
Start: 2024-08-27

## 2024-08-26 RX ORDER — SODIUM CHLORIDE 9 MG/ML
5-250 INJECTION, SOLUTION INTRAVENOUS PRN
Status: DISCONTINUED | OUTPATIENT
Start: 2024-08-26 | End: 2024-08-27 | Stop reason: HOSPADM

## 2024-08-26 RX ORDER — ALBUTEROL SULFATE 90 UG/1
4 AEROSOL, METERED RESPIRATORY (INHALATION) PRN
Status: DISCONTINUED | OUTPATIENT
Start: 2024-08-26 | End: 2024-08-27 | Stop reason: HOSPADM

## 2024-08-26 RX ORDER — HEPARIN 100 UNIT/ML
500 SYRINGE INTRAVENOUS PRN
Status: CANCELLED | OUTPATIENT
Start: 2024-08-27

## 2024-08-26 RX ORDER — EPINEPHRINE 1 MG/ML
0.3 INJECTION, SOLUTION, CONCENTRATE INTRAVENOUS PRN
Status: CANCELLED | OUTPATIENT
Start: 2024-08-27

## 2024-08-26 RX ORDER — ALBUTEROL SULFATE 90 UG/1
4 AEROSOL, METERED RESPIRATORY (INHALATION) PRN
Status: CANCELLED | OUTPATIENT
Start: 2024-08-27

## 2024-08-26 RX ORDER — SODIUM CHLORIDE 9 MG/ML
INJECTION, SOLUTION INTRAVENOUS CONTINUOUS
Status: DISCONTINUED | OUTPATIENT
Start: 2024-08-26 | End: 2024-08-27 | Stop reason: HOSPADM

## 2024-08-26 RX ORDER — ONDANSETRON 2 MG/ML
8 INJECTION INTRAMUSCULAR; INTRAVENOUS
Status: DISCONTINUED | OUTPATIENT
Start: 2024-08-26 | End: 2024-08-27 | Stop reason: HOSPADM

## 2024-08-26 RX ORDER — EPINEPHRINE 1 MG/ML
0.3 INJECTION, SOLUTION, CONCENTRATE INTRAVENOUS PRN
Status: DISCONTINUED | OUTPATIENT
Start: 2024-08-26 | End: 2024-08-27 | Stop reason: HOSPADM

## 2024-08-26 RX ORDER — ACETAMINOPHEN 325 MG/1
650 TABLET ORAL
Status: DISCONTINUED | OUTPATIENT
Start: 2024-08-26 | End: 2024-08-27 | Stop reason: HOSPADM

## 2024-08-26 RX ORDER — SODIUM CHLORIDE 9 MG/ML
INJECTION, SOLUTION INTRAVENOUS CONTINUOUS
Status: CANCELLED | OUTPATIENT
Start: 2024-08-27

## 2024-08-26 RX ORDER — DIPHENHYDRAMINE HYDROCHLORIDE 50 MG/ML
50 INJECTION INTRAMUSCULAR; INTRAVENOUS
Status: DISCONTINUED | OUTPATIENT
Start: 2024-08-26 | End: 2024-08-27 | Stop reason: HOSPADM

## 2024-08-26 RX ADMIN — SODIUM CHLORIDE 50 ML/HR: 9 INJECTION, SOLUTION INTRAVENOUS at 14:07

## 2024-08-26 RX ADMIN — IRON SUCROSE 200 MG: 20 INJECTION, SOLUTION INTRAVENOUS at 14:11

## 2024-08-26 ASSESSMENT — PAIN SCALES - GENERAL: PAINLEVEL_OUTOF10: 0

## 2024-08-26 NOTE — PROGRESS NOTES
[facility] Progress Note    Date: 2024    Name: Dana Merrill    MRN: [id number]         : 2000      Ms. Merrill was assessed and education was provided.     Ms. Merrill's vitals were reviewed and patient was observed for 5 minutes prior to treatment.   Vitals:    24 1452   BP: (!) 110/58   Pulse: 99   Resp: 16   Temp: 98.6 °F (37 °C)   SpO2: 97%       NS infusing at 50 ml/hr  Venofer 200 mg administered IVP over 10 minutes.   Patient monitored for 30 minutes after infusion. No adverse reactions, VS stable.    Ms. Merrill tolerated the infusion, and had no complaints.  Patient armband removed and placed in the shredder.    [facility]. Merrill [facility] in [facility] condition at 2:55. She is to return on 24 at 2:30 for her next appointment.    Mallika Steiner RN  2024  3:14 PM

## 2024-08-27 ENCOUNTER — ROUTINE PRENATAL (OUTPATIENT)
Age: 24
End: 2024-08-27

## 2024-08-27 VITALS
WEIGHT: 182 LBS | RESPIRATION RATE: 12 BRPM | OXYGEN SATURATION: 98 % | TEMPERATURE: 98.1 F | HEIGHT: 64 IN | HEART RATE: 82 BPM | DIASTOLIC BLOOD PRESSURE: 70 MMHG | SYSTOLIC BLOOD PRESSURE: 108 MMHG | BODY MASS INDEX: 31.07 KG/M2

## 2024-08-27 DIAGNOSIS — Z34.90 PREGNANCY, UNSPECIFIED GESTATIONAL AGE: Primary | ICD-10-CM

## 2024-08-27 DIAGNOSIS — O26.13 LOW WEIGHT GAIN DURING PREGNANCY IN THIRD TRIMESTER: ICD-10-CM

## 2024-08-27 PROCEDURE — 0502F SUBSEQUENT PRENATAL CARE: CPT | Performed by: OBSTETRICS & GYNECOLOGY

## 2024-08-27 NOTE — PROGRESS NOTES
ULTRASOUND TODAY 08/27/2024:  LIMITED OB SCAN A SINGLE VERTEX 36W2D IUP IS SEEN. FETAL CARDIAC MOTION OBSERVED. LIMITED ANATOMY WAS VISUALIZED AND APPEARS WNL. BPP= 8/8 COSME= 9.1CM PLACENTA APPEARS WITHIN NORMAL LIMITS.     Dana Merrill is 36w2d   Doing well  +FM  Denies LOF, bleeding/spotting, cramping, headache, blurred vision, edema, or RUQ pain.

## 2024-08-27 NOTE — PROGRESS NOTES
Patient to be seen in office today for ultrasound and follow up appointment Order placed for ultrasound due to growth restriction per Dr. Rasheed's verbal order.

## 2024-08-28 ENCOUNTER — HOSPITAL ENCOUNTER (OUTPATIENT)
Facility: HOSPITAL | Age: 24
Setting detail: INFUSION SERIES
End: 2024-08-28
Payer: COMMERCIAL

## 2024-08-29 ENCOUNTER — HOSPITAL ENCOUNTER (OUTPATIENT)
Facility: HOSPITAL | Age: 24
Setting detail: INFUSION SERIES
End: 2024-08-29
Payer: COMMERCIAL

## 2024-08-29 NOTE — PROGRESS NOTES
Patient here for return OB visit at 36w2d. She reports no concerns.  She reports good fetal movement.   She denies vaginal bleeding, loss of fluid, abnormal vaginal discharge, UTI symptoms, cramping, or contractions.     The ultrasound images were reviewed with the patient. Changes were made to the ultrasound impression as deemed necessary.         /70 (Site: Left Upper Arm, Position: Sitting)   Pulse 82   Temp 98.1 °F (36.7 °C) (Oral)   Resp 12   Ht 1.626 m (5' 4\")   Wt 82.6 kg (182 lb)   LMP 2023 (Exact Date)   SpO2 98%   BMI 31.24 kg/m²          Prenatal Fetal Information  Fetal HR: U/S TODAY  Movement: Present      Patient Active Problem List    Diagnosis Date Noted    Iron deficiency anemia 2024    Postpartum depression 2023    Generalized anxiety disorder with panic attacks 2023    Pregnancy 2022     Primary Provider: Alejandra Rasheed for delivery     EDC by: US/LMP  Social: oldest daughter,john. Luisa - Michael   IOB labs: Hep B neg.Hep C neg. RPR neg. HIV neg. Varicella nonimmune /Rubella immune A POSITIVE  Genetic Screening:[place] desires 24 Horizon declinedGIRL [place] \"Eileen\", MSAFP declined  Anatomy:  A SINGLE VIABLE IUP AT 20W4D IS SEEN. FETAL CARDIAC MOTION OBSERVED. FETAL ANATOMY WAS WELL VISUALIZED AND APPEARS WNL. NO ABNORMALITIES WERE SEEN ON TODAYS EXAM. APPROPRIATE GROWTH MEASURED. SIZE = DATES. COSME, PLACENTA AND CERVIX APPEAR WITHIN NORMAL LIMITS. GENDER: FEMALE  AC11%tile   3rd TM labs: GTT 98 Hgb 11.3 Plts 236  Flu:__ TDAP: 24  Rhogam: A POSITIVE     GBS:  Labor plan:   -Epidural   Postpartum  -Breast  -Pap  - BC: nexplanon     Problem List:  1)Prior Csx2  -repeat on 24  2) Short interval pregnancy   3) Cough variant asthma  4) anxiety/depression         Posttraumatic stress disorder 2018    Cyclothymia 2018    Social phobia 2018    Asthma, cough variant 2012       No follow-ups on file.    Charlene  KAELA Rasheed MD

## 2024-08-30 ENCOUNTER — HOSPITAL ENCOUNTER (OUTPATIENT)
Facility: HOSPITAL | Age: 24
Setting detail: INFUSION SERIES
Discharge: HOME | End: 2024-08-30
Payer: COMMERCIAL

## 2024-08-30 VITALS
DIASTOLIC BLOOD PRESSURE: 56 MMHG | TEMPERATURE: 98.4 F | WEIGHT: 184 LBS | RESPIRATION RATE: 16 BRPM | OXYGEN SATURATION: 96 % | SYSTOLIC BLOOD PRESSURE: 113 MMHG | HEART RATE: 87 BPM | BODY MASS INDEX: 31.58 KG/M2

## 2024-08-30 DIAGNOSIS — D50.9 IRON DEFICIENCY ANEMIA, UNSPECIFIED IRON DEFICIENCY ANEMIA TYPE: Primary | ICD-10-CM

## 2024-08-30 LAB
GP B STREP DNA SPEC QL NAA+PROBE: NEGATIVE
SPECIMEN SOURCE: NORMAL

## 2024-08-30 PROCEDURE — 2580000003 HC RX 258: Performed by: OBSTETRICS & GYNECOLOGY

## 2024-08-30 PROCEDURE — 96374 THER/PROPH/DIAG INJ IV PUSH: CPT

## 2024-08-30 PROCEDURE — 6360000002 HC RX W HCPCS: Performed by: OBSTETRICS & GYNECOLOGY

## 2024-08-30 RX ORDER — SODIUM CHLORIDE 9 MG/ML
5-250 INJECTION, SOLUTION INTRAVENOUS PRN
OUTPATIENT
Start: 2024-09-01

## 2024-08-30 RX ORDER — SODIUM CHLORIDE 9 MG/ML
INJECTION, SOLUTION INTRAVENOUS CONTINUOUS
OUTPATIENT
Start: 2024-09-01

## 2024-08-30 RX ORDER — SODIUM CHLORIDE 9 MG/ML
INJECTION, SOLUTION INTRAVENOUS CONTINUOUS
Status: DISCONTINUED | OUTPATIENT
Start: 2024-08-30 | End: 2024-08-31 | Stop reason: HOSPADM

## 2024-08-30 RX ORDER — SODIUM CHLORIDE 9 MG/ML
5-250 INJECTION, SOLUTION INTRAVENOUS PRN
Status: DISCONTINUED | OUTPATIENT
Start: 2024-08-30 | End: 2024-08-31 | Stop reason: HOSPADM

## 2024-08-30 RX ORDER — ALBUTEROL SULFATE 90 UG/1
4 AEROSOL, METERED RESPIRATORY (INHALATION) PRN
Status: DISCONTINUED | OUTPATIENT
Start: 2024-08-30 | End: 2024-08-31 | Stop reason: HOSPADM

## 2024-08-30 RX ORDER — EPINEPHRINE 1 MG/ML
0.3 INJECTION, SOLUTION, CONCENTRATE INTRAVENOUS PRN
Status: DISCONTINUED | OUTPATIENT
Start: 2024-08-30 | End: 2024-08-31 | Stop reason: HOSPADM

## 2024-08-30 RX ORDER — EPINEPHRINE 1 MG/ML
0.3 INJECTION, SOLUTION, CONCENTRATE INTRAVENOUS PRN
OUTPATIENT
Start: 2024-09-01

## 2024-08-30 RX ORDER — ACETAMINOPHEN 325 MG/1
650 TABLET ORAL
Status: DISCONTINUED | OUTPATIENT
Start: 2024-08-30 | End: 2024-08-31 | Stop reason: HOSPADM

## 2024-08-30 RX ORDER — DIPHENHYDRAMINE HYDROCHLORIDE 50 MG/ML
50 INJECTION INTRAMUSCULAR; INTRAVENOUS
Status: DISCONTINUED | OUTPATIENT
Start: 2024-08-30 | End: 2024-08-31 | Stop reason: HOSPADM

## 2024-08-30 RX ORDER — SODIUM CHLORIDE 0.9 % (FLUSH) 0.9 %
5-40 SYRINGE (ML) INJECTION PRN
Status: DISCONTINUED | OUTPATIENT
Start: 2024-08-30 | End: 2024-08-31 | Stop reason: HOSPADM

## 2024-08-30 RX ORDER — DIPHENHYDRAMINE HYDROCHLORIDE 50 MG/ML
50 INJECTION INTRAMUSCULAR; INTRAVENOUS
OUTPATIENT
Start: 2024-09-01

## 2024-08-30 RX ORDER — HEPARIN 100 UNIT/ML
500 SYRINGE INTRAVENOUS PRN
OUTPATIENT
Start: 2024-09-01

## 2024-08-30 RX ORDER — ACETAMINOPHEN 325 MG/1
650 TABLET ORAL
OUTPATIENT
Start: 2024-09-01

## 2024-08-30 RX ORDER — ONDANSETRON 2 MG/ML
8 INJECTION INTRAMUSCULAR; INTRAVENOUS
Status: DISCONTINUED | OUTPATIENT
Start: 2024-08-30 | End: 2024-08-31 | Stop reason: HOSPADM

## 2024-08-30 RX ORDER — ONDANSETRON 2 MG/ML
8 INJECTION INTRAMUSCULAR; INTRAVENOUS
OUTPATIENT
Start: 2024-09-01

## 2024-08-30 RX ORDER — SODIUM CHLORIDE 0.9 % (FLUSH) 0.9 %
5-40 SYRINGE (ML) INJECTION PRN
OUTPATIENT
Start: 2024-09-01

## 2024-08-30 RX ORDER — ALBUTEROL SULFATE 90 UG/1
4 AEROSOL, METERED RESPIRATORY (INHALATION) PRN
OUTPATIENT
Start: 2024-09-01

## 2024-08-30 RX ADMIN — IRON SUCROSE 200 MG: 20 INJECTION, SOLUTION INTRAVENOUS at 14:58

## 2024-08-30 RX ADMIN — SODIUM CHLORIDE 250 ML/HR: 9 INJECTION, SOLUTION INTRAVENOUS at 14:51

## 2024-08-30 ASSESSMENT — PAIN SCALES - GENERAL: PAINLEVEL_OUTOF10: 0

## 2024-08-30 NOTE — PROGRESS NOTES
[facility] Progress Note    Date: 2024    Name: Dana Merrill    MRN: [id number]         : 2000      Ms. Merrill was assessed and education was provided.     Ms. Merrill's vitals were reviewed and patient was observed for 5 minutes prior to treatment.   Vitals:    24 1515   BP: (!) 113/56   Pulse: 87   Resp: 16   Temp: 98.5 °F (36.9 °C)   SpO2: 96%       IV started in right AC without difficulty, brisk blood return.  NS at 250 ml/hr via pump.  Venofer 200 mg IVP over 10 minutes.  No adverse reactions following Venofer, VS stable.  Patient observed for 30 minutes, no adverse reactions and VS stable at end of observation.   Line and IV flushed, IV removed and band aid to site.      Ms. Merrill tolerated the infusion, and had no complaints.  Patient armband removed and shredded    [facility] at 1550. She is to return on September 3, 2024 at 1430 for her next appointment.    Allyson Harrington RN  2024  3:32 PM

## 2024-08-30 NOTE — DISCHARGE INSTRUCTIONS
iron sucrose (injection)  Pronunciation:  EYE urn NAHUM goodson  Brand:  Venofer  What is the most important information I should know about iron sucrose?  Follow all directions on your medicine label and package. Tell each of your healthcare providers about all your medical conditions, allergies, and all medicines you use.  What is iron sucrose?  Iron sucrose is used to treat iron deficiency anemia in people with kidney disease.  Iron sucrose is for use in adults and children at least 2 years old.  Iron sucrose is not for treating other forms of anemia not caused by iron deficiency.   Iron sucrose may also be used for purposes not listed in this medication guide.  What should I discuss with my healthcare provider before I receive iron sucrose?  You should not be treated with this medicine if you have ever had an allergic reaction to an iron injection.  Tell your doctor if you have ever had:  hemochromatosis or iron overload (the buildup of excess iron).  Tell your doctor if you are pregnant or plan to become pregnant. Iron sucrose can harm an unborn baby if you have a severe reaction to this medicine during your second or third trimester. However, not treating iron deficiency anemia during pregnancy may cause complications such as premature birth or low birth weight. The benefit of treating your condition during pregnancy may outweigh any risks.  If you are breastfeeding, tell your doctor if you notice diarrhea or constipation in the nursing baby.  How is iron sucrose given?  Iron sucrose is given as an infusion into a vein. A healthcare provider will give you this injection.  This medicine is sometimes given slowly, and the infusion can take up to 2.5 hours to complete.  Tell your caregivers if you feel any burning, pain, or swelling around the IV needle when iron sucrose is injected.  You will be watched closely for at least 30 minutes to make sure you do not have an allergic reaction.  You will need frequent  not complete. Other drugs may affect iron sucrose, including prescription and over-the-counter medicines, vitamins, and herbal products. Not all possible drug interactions are listed here.  Where can I get more information?  Your doctor or pharmacist can provide more information about iron sucrose injection.  Remember, keep this and all other medicines out of the reach of children, never share your medicines with others, and use this medication only for the indication prescribed.   Every effort has been made to ensure that the information provided by [employer]. ('[employer]') is accurate, up-to-date, and complete, but no guarantee is made to that effect. Drug information contained herein may be time sensitive. [employer] information has been compiled for use by healthcare practitioners and consumers in the [place] and therefore [employer] does not warrant that uses outside of the [place] are appropriate, unless specifically indicated otherwise. [employer]'s drug information does not endorse drugs, diagnose patients or recommend therapy. [employer] drug information is an informational resource designed to assist licensed healthcare practitioners in caring for their patients and/or to serve consumers viewing this service as a supplement to, and not a substitute for, the expertise, skill, knowledge and judgment of healthcare practitioners. The absence of a warning for a given drug or drug combination in no way should be construed to indicate that the drug or drug combination is safe, effective or appropriate for any given patient. [employer] does not assume any responsibility for any aspect of healthcare administered with the aid of information [employer] provides. The information contained herein is not intended to cover all possible uses, directions, precautions, warnings, drug interactions, allergic reactions, or adverse effects. If you have questions about the drugs you are taking, check with your doctor, nurse or

## 2024-09-03 ENCOUNTER — HOSPITAL ENCOUNTER (OUTPATIENT)
Facility: HOSPITAL | Age: 24
Setting detail: INFUSION SERIES
Discharge: HOME | End: 2024-09-03
Payer: COMMERCIAL

## 2024-09-03 VITALS
BODY MASS INDEX: 31.51 KG/M2 | DIASTOLIC BLOOD PRESSURE: 60 MMHG | RESPIRATION RATE: 16 BRPM | SYSTOLIC BLOOD PRESSURE: 114 MMHG | WEIGHT: 183.6 LBS | HEART RATE: 84 BPM | TEMPERATURE: 96.5 F | OXYGEN SATURATION: 98 %

## 2024-09-03 DIAGNOSIS — D50.9 IRON DEFICIENCY ANEMIA, UNSPECIFIED IRON DEFICIENCY ANEMIA TYPE: Primary | ICD-10-CM

## 2024-09-03 PROCEDURE — 96374 THER/PROPH/DIAG INJ IV PUSH: CPT

## 2024-09-03 PROCEDURE — 2580000003 HC RX 258: Performed by: OBSTETRICS & GYNECOLOGY

## 2024-09-03 PROCEDURE — 6360000002 HC RX W HCPCS: Performed by: OBSTETRICS & GYNECOLOGY

## 2024-09-03 RX ORDER — EPINEPHRINE 1 MG/ML
0.3 INJECTION, SOLUTION, CONCENTRATE INTRAVENOUS PRN
Status: DISCONTINUED | OUTPATIENT
Start: 2024-09-03 | End: 2024-09-04 | Stop reason: HOSPADM

## 2024-09-03 RX ORDER — EPINEPHRINE 1 MG/ML
0.3 INJECTION, SOLUTION, CONCENTRATE INTRAVENOUS PRN
OUTPATIENT
Start: 2024-09-03

## 2024-09-03 RX ORDER — ALBUTEROL SULFATE 90 UG/1
4 AEROSOL, METERED RESPIRATORY (INHALATION) PRN
OUTPATIENT
Start: 2024-09-03

## 2024-09-03 RX ORDER — SODIUM CHLORIDE 9 MG/ML
5-250 INJECTION, SOLUTION INTRAVENOUS PRN
OUTPATIENT
Start: 2024-09-03

## 2024-09-03 RX ORDER — HEPARIN 100 UNIT/ML
500 SYRINGE INTRAVENOUS PRN
OUTPATIENT
Start: 2024-09-03

## 2024-09-03 RX ORDER — ALBUTEROL SULFATE 90 UG/1
4 AEROSOL, METERED RESPIRATORY (INHALATION) PRN
Status: DISCONTINUED | OUTPATIENT
Start: 2024-09-03 | End: 2024-09-04 | Stop reason: HOSPADM

## 2024-09-03 RX ORDER — DIPHENHYDRAMINE HYDROCHLORIDE 50 MG/ML
50 INJECTION INTRAMUSCULAR; INTRAVENOUS
Status: DISCONTINUED | OUTPATIENT
Start: 2024-09-03 | End: 2024-09-04 | Stop reason: HOSPADM

## 2024-09-03 RX ORDER — SODIUM CHLORIDE 9 MG/ML
5-250 INJECTION, SOLUTION INTRAVENOUS PRN
Status: DISCONTINUED | OUTPATIENT
Start: 2024-09-03 | End: 2024-09-04 | Stop reason: HOSPADM

## 2024-09-03 RX ORDER — SODIUM CHLORIDE 9 MG/ML
INJECTION, SOLUTION INTRAVENOUS CONTINUOUS
OUTPATIENT
Start: 2024-09-03

## 2024-09-03 RX ORDER — SODIUM CHLORIDE 0.9 % (FLUSH) 0.9 %
5-40 SYRINGE (ML) INJECTION PRN
OUTPATIENT
Start: 2024-09-03

## 2024-09-03 RX ORDER — ONDANSETRON 2 MG/ML
8 INJECTION INTRAMUSCULAR; INTRAVENOUS
OUTPATIENT
Start: 2024-09-03

## 2024-09-03 RX ORDER — ONDANSETRON 2 MG/ML
8 INJECTION INTRAMUSCULAR; INTRAVENOUS
Status: DISCONTINUED | OUTPATIENT
Start: 2024-09-03 | End: 2024-09-04 | Stop reason: HOSPADM

## 2024-09-03 RX ORDER — SODIUM CHLORIDE 9 MG/ML
INJECTION, SOLUTION INTRAVENOUS CONTINUOUS
Status: DISCONTINUED | OUTPATIENT
Start: 2024-09-03 | End: 2024-09-04 | Stop reason: HOSPADM

## 2024-09-03 RX ORDER — ACETAMINOPHEN 325 MG/1
650 TABLET ORAL
Status: DISCONTINUED | OUTPATIENT
Start: 2024-09-03 | End: 2024-09-04 | Stop reason: HOSPADM

## 2024-09-03 RX ORDER — ACETAMINOPHEN 325 MG/1
650 TABLET ORAL
OUTPATIENT
Start: 2024-09-03

## 2024-09-03 RX ORDER — DIPHENHYDRAMINE HYDROCHLORIDE 50 MG/ML
50 INJECTION INTRAMUSCULAR; INTRAVENOUS
OUTPATIENT
Start: 2024-09-03

## 2024-09-03 RX ADMIN — SODIUM CHLORIDE 250 ML/HR: 9 INJECTION, SOLUTION INTRAVENOUS at 15:24

## 2024-09-03 RX ADMIN — IRON SUCROSE 200 MG: 20 INJECTION, SOLUTION INTRAVENOUS at 15:24

## 2024-09-03 ASSESSMENT — PAIN SCALES - GENERAL: PAINLEVEL_OUTOF10: 0

## 2024-09-03 NOTE — PROGRESS NOTES
[facility] Progress Note    Date: September 3, 2024    Name: Dana Merrill    MRN: [id number]         : 2000      Ms. Merrill was assessed and education was provided. Pt reports she is to have a C section on .     Ms. Merrill's vitals were reviewed and patient was observed for 5 minutes prior to treatment.   Vitals:    24 1445   BP: 121/61   Pulse: 84   Resp: 16   Temp: (!) 96.5 °F (35.8 °C)   SpO2: 98%       IV started in right a/c     1524 : Ns infusing. Venofer 200 mg was given IVP via side port of free flowing IV.      Pt monitored for thirty minutes after IVP. No adverse reactions.     IV D/C'd.     Ms. Merrill tolerated the infusion, and had no complaints.  Patient armband removed and shredded    [facility] Merrill [facility] in [facility] condition at 1612.  This completes treatment plan.     Clover Harkins RN  September 3, 2024  4:09 PM

## 2024-09-04 RX ORDER — FERROUS SULFATE 325(65) MG
325 TABLET, DELAYED RELEASE (ENTERIC COATED) ORAL
Qty: 30 TABLET | Refills: 0 | Status: CANCELLED | OUTPATIENT
Start: 2024-09-04

## 2024-09-05 ENCOUNTER — ROUTINE PRENATAL (OUTPATIENT)
Age: 24
End: 2024-09-05

## 2024-09-05 VITALS
SYSTOLIC BLOOD PRESSURE: 123 MMHG | BODY MASS INDEX: 31.24 KG/M2 | WEIGHT: 183 LBS | DIASTOLIC BLOOD PRESSURE: 76 MMHG | OXYGEN SATURATION: 99 % | TEMPERATURE: 98.4 F | HEIGHT: 64 IN | HEART RATE: 101 BPM

## 2024-09-05 DIAGNOSIS — Z34.90 PREGNANCY, UNSPECIFIED GESTATIONAL AGE: Primary | ICD-10-CM

## 2024-09-05 PROCEDURE — 0502F SUBSEQUENT PRENATAL CARE: CPT | Performed by: OBSTETRICS & GYNECOLOGY

## 2024-09-05 NOTE — PROGRESS NOTES
Patient here for return OB visit at 37w4d. She reports intermittent tenderness over her pfannenstiel incision since Tuesday. Feels like it's a little swollen.    BPP today . Interval growth shows worsening FGR. Previous US EFW 18%tile with AC of 7%tile. Today's US EFW <10%tile with AC <2%tile. The patient just ate prior to visit. I recommend delivery tomorrow. L&D notified. Patient placed or OR schedule for 4PM. Discussed warning signs and symptoms and reasons to go to L&D prior to scheduled CS tomorrow.   Discussed kick counts.   She report excellent fetal movement.       She denies vaginal bleeding, loss of fluid, abnormal vaginal discharge, UTI symptoms, or cramping.         /76 (Site: Right Upper Arm, Position: Sitting)   Pulse (!) 101   Temp 98.4 °F (36.9 °C) (Oral)   Ht 1.626 m (5' 4\")   Wt 83 kg (183 lb)   LMP 2023 (Exact Date)   SpO2 99%   BMI 31.41 kg/m²    Cervix:deferred      Patient Active Problem List    Diagnosis Date Noted    Iron deficiency anemia 2024    Postpartum depression 2023    Generalized anxiety disorder with panic attacks 2023    Pregnancy 2022     Primary Provider: Alejandra Rasheed for delivery     EDC by: US/LMP  Social: oldest daughter,john. Luisa - Michael   IOB labs: Hep B neg.Hep C neg. RPR neg. HIV neg. Varicella nonimmune /Rubella immune A POSITIVE  Genetic Screening:[place]or[place] desires 24 Horizon declined GIRL [place] \"Eileen\", MSAFP declined  Anatomy:  A SINGLE VIABLE IUP AT 20W4D IS SEEN. FETAL CARDIAC MOTION OBSERVED. FETAL ANATOMY WAS WELL VISUALIZED AND APPEARS WNL. NO ABNORMALITIES WERE SEEN ON TODAYS EXAM. APPROPRIATE GROWTH MEASURED. SIZE = DATES. COSME, PLACENTA AND CERVIX APPEAR WITHIN NORMAL LIMITS. GENDER: FEMALE  AC11%tile   3rd TM labs: GTT 98 Hgb 11.3 Plts 236  Flu:__ TDAP: 24  Rhogam: A POSITIVE     GBS:  Labor plan:   -Epidural   Postpartum  -Breast  -Pap  - BC: nexplanon     Problem List:  1)Prior

## 2024-09-05 NOTE — PROGRESS NOTES
Dana Merrill is 37w4d   Doing well  +FM  Denies LOF, bleeding/spotting, cramping, headache, blurred vision, edema, or RUQ pain.     ULTRASOUND TODAY 09/05/2024:  LIMITED OB SCAN A SINGLE VERTEX 37W4D IUP IS SEEN. FETAL CARDIAC MOTION OBSERVED. LIMITED ANATOMY WAS VISUALIZED AND APPEARS WNL. BPP= 8/8 COSME= 10.1 PLACENTA APPEARS WNL.

## 2024-09-06 ENCOUNTER — ANESTHESIA (OUTPATIENT)
Facility: HOSPITAL | Age: 24
End: 2024-09-06
Payer: COMMERCIAL

## 2024-09-06 ENCOUNTER — HOSPITAL ENCOUNTER (INPATIENT)
Facility: HOSPITAL | Age: 24
LOS: 2 days | Discharge: HOME | DRG: 540 | End: 2024-09-08
Attending: OBSTETRICS & GYNECOLOGY | Admitting: OBSTETRICS & GYNECOLOGY
Payer: COMMERCIAL

## 2024-09-06 ENCOUNTER — ANESTHESIA EVENT (OUTPATIENT)
Facility: HOSPITAL | Age: 24
End: 2024-09-06
Payer: COMMERCIAL

## 2024-09-06 LAB
ABO + RH BLD: NORMAL
BLOOD GROUP ANTIBODIES SERPL: NORMAL
ERYTHROCYTE [DISTWIDTH] IN BLOOD BY AUTOMATED COUNT: 14.6 % (ref 11.5–14.5)
HCT VFR BLD AUTO: 38.4 % (ref 35–47)
HGB BLD-MCNC: 12.4 G/DL (ref 11.5–16)
MCH RBC QN AUTO: 31.6 PG (ref 26–34)
MCHC RBC AUTO-ENTMCNC: 32.3 G/DL (ref 30–36.5)
MCV RBC AUTO: 98 FL (ref 80–99)
NRBC # BLD: 0 K/UL (ref 0–0.01)
NRBC BLD-RTO: 0 PER 100 WBC
PLATELET # BLD AUTO: 295 K/UL (ref 150–400)
PMV BLD AUTO: 9.6 FL (ref 8.9–12.9)
RBC # BLD AUTO: 3.92 M/UL (ref 3.8–5.2)
RPR SER QL: NONREACTIVE
SPECIMEN EXP DATE BLD: NORMAL
WBC # BLD AUTO: 11.7 K/UL (ref 3.6–11)

## 2024-09-06 PROCEDURE — 1100000000 HC RM PRIVATE

## 2024-09-06 PROCEDURE — 85027 COMPLETE CBC AUTOMATED: CPT

## 2024-09-06 PROCEDURE — 86900 BLOOD TYPING SEROLOGIC ABO: CPT

## 2024-09-06 PROCEDURE — 86901 BLOOD TYPING SEROLOGIC RH(D): CPT

## 2024-09-06 PROCEDURE — 36415 COLL VENOUS BLD VENIPUNCTURE: CPT

## 2024-09-06 PROCEDURE — 86850 RBC ANTIBODY SCREEN: CPT

## 2024-09-06 PROCEDURE — 86592 SYPHILIS TEST NON-TREP QUAL: CPT

## 2024-09-06 RX ORDER — SODIUM CHLORIDE 9 MG/ML
INJECTION, SOLUTION INTRAVENOUS PRN
Status: DISCONTINUED | OUTPATIENT
Start: 2024-09-06 | End: 2024-09-07 | Stop reason: SDUPTHER

## 2024-09-06 RX ORDER — SODIUM CHLORIDE 0.9 % (FLUSH) 0.9 %
10 SYRINGE (ML) INJECTION PRN
Status: DISCONTINUED | OUTPATIENT
Start: 2024-09-06 | End: 2024-09-07 | Stop reason: SDUPTHER

## 2024-09-06 RX ORDER — SODIUM CHLORIDE, SODIUM LACTATE, POTASSIUM CHLORIDE, AND CALCIUM CHLORIDE .6; .31; .03; .02 G/100ML; G/100ML; G/100ML; G/100ML
1000 INJECTION, SOLUTION INTRAVENOUS ONCE
Status: COMPLETED | OUTPATIENT
Start: 2024-09-06 | End: 2024-09-07

## 2024-09-06 RX ORDER — SODIUM CHLORIDE, SODIUM LACTATE, POTASSIUM CHLORIDE, CALCIUM CHLORIDE 600; 310; 30; 20 MG/100ML; MG/100ML; MG/100ML; MG/100ML
INJECTION, SOLUTION INTRAVENOUS CONTINUOUS
Status: DISCONTINUED | OUTPATIENT
Start: 2024-09-06 | End: 2024-09-07 | Stop reason: SDUPTHER

## 2024-09-06 RX ORDER — ONDANSETRON 2 MG/ML
4 INJECTION INTRAMUSCULAR; INTRAVENOUS EVERY 6 HOURS PRN
Status: DISCONTINUED | OUTPATIENT
Start: 2024-09-06 | End: 2024-09-07

## 2024-09-06 RX ORDER — SODIUM CHLORIDE 0.9 % (FLUSH) 0.9 %
10 SYRINGE (ML) INJECTION EVERY 12 HOURS SCHEDULED
Status: DISCONTINUED | OUTPATIENT
Start: 2024-09-06 | End: 2024-09-07 | Stop reason: SDUPTHER

## 2024-09-06 RX ORDER — ACETAMINOPHEN 325 MG/1
975 TABLET ORAL ONCE
Status: COMPLETED | OUTPATIENT
Start: 2024-09-06 | End: 2024-09-07

## 2024-09-07 PROCEDURE — 2580000003 HC RX 258: Performed by: OBSTETRICS & GYNECOLOGY

## 2024-09-07 PROCEDURE — 3700000001 HC ADD 15 MINUTES (ANESTHESIA): Performed by: OBSTETRICS & GYNECOLOGY

## 2024-09-07 PROCEDURE — 3609079900 HC CESAREAN SECTION: Performed by: OBSTETRICS & GYNECOLOGY

## 2024-09-07 PROCEDURE — 3700000000 HC ANESTHESIA ATTENDED CARE: Performed by: OBSTETRICS & GYNECOLOGY

## 2024-09-07 PROCEDURE — 2500000003 HC RX 250 WO HCPCS: Performed by: OBSTETRICS & GYNECOLOGY

## 2024-09-07 PROCEDURE — 2709999900 HC NON-CHARGEABLE SUPPLY: Performed by: OBSTETRICS & GYNECOLOGY

## 2024-09-07 PROCEDURE — 4A1HXCZ MONITORING OF PRODUCTS OF CONCEPTION, CARDIAC RATE, EXTERNAL APPROACH: ICD-10-PCS | Performed by: OBSTETRICS & GYNECOLOGY

## 2024-09-07 PROCEDURE — 6370000000 HC RX 637 (ALT 250 FOR IP): Performed by: OBSTETRICS & GYNECOLOGY

## 2024-09-07 PROCEDURE — 6360000002 HC RX W HCPCS: Performed by: ANESTHESIOLOGY

## 2024-09-07 PROCEDURE — 59510 CESAREAN DELIVERY: CPT | Performed by: OBSTETRICS & GYNECOLOGY

## 2024-09-07 PROCEDURE — 6360000002 HC RX W HCPCS: Performed by: OBSTETRICS & GYNECOLOGY

## 2024-09-07 PROCEDURE — 7100000000 HC PACU RECOVERY - FIRST 15 MIN: Performed by: OBSTETRICS & GYNECOLOGY

## 2024-09-07 PROCEDURE — 94760 N-INVAS EAR/PLS OXIMETRY 1: CPT

## 2024-09-07 PROCEDURE — 2500000003 HC RX 250 WO HCPCS

## 2024-09-07 PROCEDURE — 6360000002 HC RX W HCPCS

## 2024-09-07 PROCEDURE — 2580000003 HC RX 258

## 2024-09-07 PROCEDURE — 6360000002 HC RX W HCPCS: Performed by: NURSE ANESTHETIST, CERTIFIED REGISTERED

## 2024-09-07 PROCEDURE — 7100000001 HC PACU RECOVERY - ADDTL 15 MIN: Performed by: OBSTETRICS & GYNECOLOGY

## 2024-09-07 PROCEDURE — 1120000000 HC RM PRIVATE OB

## 2024-09-07 RX ORDER — DIPHENHYDRAMINE HYDROCHLORIDE 50 MG/ML
25 INJECTION INTRAMUSCULAR; INTRAVENOUS EVERY 6 HOURS PRN
Status: DISCONTINUED | OUTPATIENT
Start: 2024-09-07 | End: 2024-09-08 | Stop reason: HOSPADM

## 2024-09-07 RX ORDER — SIMETHICONE 80 MG
80 TABLET,CHEWABLE ORAL EVERY 6 HOURS PRN
Status: DISCONTINUED | OUTPATIENT
Start: 2024-09-07 | End: 2024-09-08 | Stop reason: HOSPADM

## 2024-09-07 RX ORDER — OXYCODONE HYDROCHLORIDE 5 MG/1
10 TABLET ORAL EVERY 4 HOURS PRN
Status: DISCONTINUED | OUTPATIENT
Start: 2024-09-08 | End: 2024-09-07

## 2024-09-07 RX ORDER — POLYETHYLENE GLYCOL 3350 17 G/17G
17 POWDER, FOR SOLUTION ORAL DAILY PRN
Status: DISCONTINUED | OUTPATIENT
Start: 2024-09-07 | End: 2024-09-08 | Stop reason: HOSPADM

## 2024-09-07 RX ORDER — BUPIVACAINE HYDROCHLORIDE 7.5 MG/ML
INJECTION, SOLUTION INTRASPINAL
Status: COMPLETED | OUTPATIENT
Start: 2024-09-07 | End: 2024-09-07

## 2024-09-07 RX ORDER — ONDANSETRON 2 MG/ML
INJECTION INTRAMUSCULAR; INTRAVENOUS PRN
Status: DISCONTINUED | OUTPATIENT
Start: 2024-09-07 | End: 2024-09-07 | Stop reason: SDUPTHER

## 2024-09-07 RX ORDER — DEXAMETHASONE SODIUM PHOSPHATE 4 MG/ML
INJECTION, SOLUTION INTRA-ARTICULAR; INTRALESIONAL; INTRAMUSCULAR; INTRAVENOUS; SOFT TISSUE PRN
Status: DISCONTINUED | OUTPATIENT
Start: 2024-09-07 | End: 2024-09-07 | Stop reason: SDUPTHER

## 2024-09-07 RX ORDER — MORPHINE SULFATE 2 MG/ML
2 INJECTION, SOLUTION INTRAMUSCULAR; INTRAVENOUS
Status: DISCONTINUED | OUTPATIENT
Start: 2024-09-07 | End: 2024-09-08 | Stop reason: HOSPADM

## 2024-09-07 RX ORDER — PHENYLEPHRINE HCL IN 0.9% NACL 0.4MG/10ML
SYRINGE (ML) INTRAVENOUS PRN
Status: DISCONTINUED | OUTPATIENT
Start: 2024-09-07 | End: 2024-09-07 | Stop reason: SDUPTHER

## 2024-09-07 RX ORDER — IBUPROFEN 400 MG/1
800 TABLET, FILM COATED ORAL EVERY 8 HOURS
Status: DISCONTINUED | OUTPATIENT
Start: 2024-09-08 | End: 2024-09-08 | Stop reason: HOSPADM

## 2024-09-07 RX ORDER — SODIUM CHLORIDE, SODIUM LACTATE, POTASSIUM CHLORIDE, CALCIUM CHLORIDE 600; 310; 30; 20 MG/100ML; MG/100ML; MG/100ML; MG/100ML
INJECTION, SOLUTION INTRAVENOUS CONTINUOUS
Status: DISPENSED | OUTPATIENT
Start: 2024-09-07 | End: 2024-09-07

## 2024-09-07 RX ORDER — MORPHINE SULFATE 4 MG/ML
4 INJECTION, SOLUTION INTRAMUSCULAR; INTRAVENOUS
Status: DISCONTINUED | OUTPATIENT
Start: 2024-09-07 | End: 2024-09-08 | Stop reason: HOSPADM

## 2024-09-07 RX ORDER — MISOPROSTOL 200 UG/1
800 TABLET ORAL PRN
Status: DISCONTINUED | OUTPATIENT
Start: 2024-09-07 | End: 2024-09-08 | Stop reason: HOSPADM

## 2024-09-07 RX ORDER — SODIUM CHLORIDE 0.9 % (FLUSH) 0.9 %
5-40 SYRINGE (ML) INJECTION EVERY 12 HOURS SCHEDULED
Status: DISCONTINUED | OUTPATIENT
Start: 2024-09-07 | End: 2024-09-08 | Stop reason: HOSPADM

## 2024-09-07 RX ORDER — ACETAMINOPHEN 500 MG
1000 TABLET ORAL EVERY 8 HOURS SCHEDULED
Status: DISCONTINUED | OUTPATIENT
Start: 2024-09-07 | End: 2024-09-08 | Stop reason: HOSPADM

## 2024-09-07 RX ORDER — OXYCODONE HYDROCHLORIDE 5 MG/1
5 TABLET ORAL EVERY 4 HOURS PRN
Status: DISCONTINUED | OUTPATIENT
Start: 2024-09-08 | End: 2024-09-07

## 2024-09-07 RX ORDER — ONDANSETRON 4 MG/1
4 TABLET, ORALLY DISINTEGRATING ORAL EVERY 8 HOURS PRN
Status: DISCONTINUED | OUTPATIENT
Start: 2024-09-07 | End: 2024-09-08 | Stop reason: HOSPADM

## 2024-09-07 RX ORDER — OXYCODONE HYDROCHLORIDE 5 MG/1
10 TABLET ORAL EVERY 4 HOURS PRN
Status: DISCONTINUED | OUTPATIENT
Start: 2024-09-07 | End: 2024-09-08 | Stop reason: HOSPADM

## 2024-09-07 RX ORDER — ONDANSETRON 2 MG/ML
4 INJECTION INTRAMUSCULAR; INTRAVENOUS EVERY 6 HOURS PRN
Status: DISCONTINUED | OUTPATIENT
Start: 2024-09-07 | End: 2024-09-08 | Stop reason: HOSPADM

## 2024-09-07 RX ORDER — MODIFIED LANOLIN
OINTMENT (GRAM) TOPICAL
Status: DISCONTINUED | OUTPATIENT
Start: 2024-09-07 | End: 2024-09-08 | Stop reason: HOSPADM

## 2024-09-07 RX ORDER — DOCUSATE SODIUM 100 MG/1
100 CAPSULE, LIQUID FILLED ORAL 2 TIMES DAILY PRN
Status: DISCONTINUED | OUTPATIENT
Start: 2024-09-07 | End: 2024-09-08 | Stop reason: HOSPADM

## 2024-09-07 RX ORDER — OXYCODONE HYDROCHLORIDE 5 MG/1
5 TABLET ORAL EVERY 4 HOURS PRN
Status: DISCONTINUED | OUTPATIENT
Start: 2024-09-07 | End: 2024-09-08 | Stop reason: HOSPADM

## 2024-09-07 RX ORDER — SODIUM CHLORIDE 0.9 % (FLUSH) 0.9 %
5-40 SYRINGE (ML) INJECTION PRN
Status: DISCONTINUED | OUTPATIENT
Start: 2024-09-07 | End: 2024-09-08 | Stop reason: HOSPADM

## 2024-09-07 RX ORDER — KETOROLAC TROMETHAMINE 30 MG/ML
30 INJECTION, SOLUTION INTRAMUSCULAR; INTRAVENOUS EVERY 6 HOURS
Status: COMPLETED | OUTPATIENT
Start: 2024-09-07 | End: 2024-09-08

## 2024-09-07 RX ORDER — KETOROLAC TROMETHAMINE 30 MG/ML
INJECTION, SOLUTION INTRAMUSCULAR; INTRAVENOUS PRN
Status: DISCONTINUED | OUTPATIENT
Start: 2024-09-07 | End: 2024-09-07 | Stop reason: SDUPTHER

## 2024-09-07 RX ORDER — FENTANYL CITRATE 50 UG/ML
INJECTION, SOLUTION INTRAMUSCULAR; INTRAVENOUS
Status: COMPLETED | OUTPATIENT
Start: 2024-09-07 | End: 2024-09-07

## 2024-09-07 RX ORDER — MORPHINE SULFATE 1 MG/ML
INJECTION, SOLUTION EPIDURAL; INTRATHECAL; INTRAVENOUS
Status: COMPLETED | OUTPATIENT
Start: 2024-09-07 | End: 2024-09-07

## 2024-09-07 RX ORDER — EPHEDRINE SULFATE 50 MG/ML
INJECTION INTRAVENOUS PRN
Status: DISCONTINUED | OUTPATIENT
Start: 2024-09-07 | End: 2024-09-07 | Stop reason: SDUPTHER

## 2024-09-07 RX ORDER — SODIUM CHLORIDE 9 MG/ML
INJECTION, SOLUTION INTRAVENOUS PRN
Status: DISCONTINUED | OUTPATIENT
Start: 2024-09-07 | End: 2024-09-08 | Stop reason: HOSPADM

## 2024-09-07 RX ADMIN — DOCUSATE SODIUM 100 MG: 100 CAPSULE, LIQUID FILLED ORAL at 17:05

## 2024-09-07 RX ADMIN — SODIUM CHLORIDE, POTASSIUM CHLORIDE, SODIUM LACTATE AND CALCIUM CHLORIDE: 600; 310; 30; 20 INJECTION, SOLUTION INTRAVENOUS at 09:31

## 2024-09-07 RX ADMIN — Medication 160 MCG: at 08:24

## 2024-09-07 RX ADMIN — WATER 2000 MG: 1 INJECTION INTRAMUSCULAR; INTRAVENOUS; SUBCUTANEOUS at 08:08

## 2024-09-07 RX ADMIN — FAMOTIDINE 20 MG: 10 INJECTION, SOLUTION INTRAVENOUS at 08:09

## 2024-09-07 RX ADMIN — SODIUM CHLORIDE, PRESERVATIVE FREE 10 ML: 5 INJECTION INTRAVENOUS at 06:55

## 2024-09-07 RX ADMIN — KETOROLAC TROMETHAMINE 30 MG: 30 INJECTION, SOLUTION INTRAMUSCULAR at 21:23

## 2024-09-07 RX ADMIN — ACETAMINOPHEN 1000 MG: 500 TABLET ORAL at 17:04

## 2024-09-07 RX ADMIN — Medication 909 ML/HR: at 08:46

## 2024-09-07 RX ADMIN — Medication 80 MCG: at 08:29

## 2024-09-07 RX ADMIN — PHENYLEPHRINE HYDROCHLORIDE 40 MCG/MIN: 10 INJECTION INTRAVENOUS at 08:18

## 2024-09-07 RX ADMIN — DEXAMETHASONE SODIUM PHOSPHATE 4 MG: 4 INJECTION INTRA-ARTICULAR; INTRALESIONAL; INTRAMUSCULAR; INTRAVENOUS; SOFT TISSUE at 08:22

## 2024-09-07 RX ADMIN — EPHEDRINE SULFATE 10 MG: 50 INJECTION INTRAVENOUS at 09:47

## 2024-09-07 RX ADMIN — EPHEDRINE SULFATE 10 MG: 50 INJECTION INTRAVENOUS at 09:18

## 2024-09-07 RX ADMIN — SODIUM CHLORIDE, POTASSIUM CHLORIDE, SODIUM LACTATE AND CALCIUM CHLORIDE: 600; 310; 30; 20 INJECTION, SOLUTION INTRAVENOUS at 08:12

## 2024-09-07 RX ADMIN — Medication 80 MCG: at 08:58

## 2024-09-07 RX ADMIN — KETOROLAC TROMETHAMINE 30 MG: 30 INJECTION, SOLUTION INTRAMUSCULAR at 15:17

## 2024-09-07 RX ADMIN — MORPHINE SULFATE 0.15 MG: 1 INJECTION EPIDURAL; INTRATHECAL; INTRAVENOUS at 08:18

## 2024-09-07 RX ADMIN — EPHEDRINE SULFATE 10 MG: 50 INJECTION INTRAVENOUS at 09:12

## 2024-09-07 RX ADMIN — FENTANYL CITRATE 20 MCG: 50 INJECTION, SOLUTION INTRAMUSCULAR; INTRAVENOUS at 08:18

## 2024-09-07 RX ADMIN — OXYCODONE HYDROCHLORIDE 5 MG: 5 TABLET ORAL at 21:24

## 2024-09-07 RX ADMIN — ACETAMINOPHEN 975 MG: 325 TABLET ORAL at 08:02

## 2024-09-07 RX ADMIN — ONDANSETRON 4 MG: 2 INJECTION INTRAMUSCULAR; INTRAVENOUS at 08:22

## 2024-09-07 RX ADMIN — SODIUM CHLORIDE, POTASSIUM CHLORIDE, SODIUM LACTATE AND CALCIUM CHLORIDE 1000 ML: 600; 310; 30; 20 INJECTION, SOLUTION INTRAVENOUS at 06:57

## 2024-09-07 RX ADMIN — DIPHENHYDRAMINE HYDROCHLORIDE 25 MG: 50 INJECTION INTRAMUSCULAR; INTRAVENOUS at 11:15

## 2024-09-07 RX ADMIN — KETOROLAC TROMETHAMINE 30 MG: 30 INJECTION, SOLUTION INTRAMUSCULAR at 09:40

## 2024-09-07 RX ADMIN — BUPIVACAINE HYDROCHLORIDE 12 MG: 7.5 INJECTION, SOLUTION INTRASPINAL at 08:18

## 2024-09-07 ASSESSMENT — PAIN DESCRIPTION - DESCRIPTORS: DESCRIPTORS: SORE

## 2024-09-07 ASSESSMENT — PAIN DESCRIPTION - LOCATION: LOCATION: INCISION

## 2024-09-07 ASSESSMENT — PAIN DESCRIPTION - ORIENTATION: ORIENTATION: LOWER

## 2024-09-07 ASSESSMENT — PAIN SCALES - GENERAL: PAINLEVEL_OUTOF10: 7

## 2024-09-07 NOTE — ANESTHESIA PRE PROCEDURE
83.3 kg (183 lb 9.6 oz)   08/30/24 83.5 kg (184 lb)     There is no height or weight on file to calculate BMI.    CBC:   Lab Results   Component Value Date/Time    WBC 11.7 09/06/2024 03:08 PM    RBC 3.92 09/06/2024 03:08 PM    HGB 12.4 09/06/2024 03:08 PM    HCT 38.4 09/06/2024 03:08 PM    MCV 98.0 09/06/2024 03:08 PM    RDW 14.6 09/06/2024 03:08 PM     09/06/2024 03:08 PM       CMP:   Lab Results   Component Value Date/Time     07/25/2024 01:46 PM    K 3.6 07/25/2024 01:46 PM     07/25/2024 01:46 PM    CO2 23 07/25/2024 01:46 PM    BUN 9 07/25/2024 01:46 PM    CREATININE 0.55 07/25/2024 01:46 PM    LABGLOM >90 07/25/2024 01:46 PM    LABGLOM >60 02/29/2024 02:31 PM    LABGLOM >60 11/05/2022 11:51 PM    GLUCOSE 86 07/25/2024 01:46 PM    CALCIUM 9.3 07/25/2024 01:46 PM    BILITOT 0.4 07/25/2024 01:46 PM    ALKPHOS 57 07/25/2024 01:46 PM    AST 8 07/25/2024 01:46 PM    ALT 12 07/25/2024 01:46 PM       POC Tests: No results for input(s): \"POCGLU\", \"POCNA\", \"POCK\", \"POCCL\", \"POCBUN\", \"POCHEMO\", \"POCHCT\" in the last 72 hours.    Coags: No results found for: \"PROTIME\", \"INR\", \"APTT\"    HCG (If Applicable): No results found for: \"PREGTESTUR\", \"PREGSERUM\", \"HCG\", \"HCGQUANT\"     ABGs: No results found for: \"PHART\", \"PO2ART\", \"DVY0MBZ\", \"OZA0SUT\", \"BEART\", \"D0DAAPLN\"     Type & Screen (If Applicable):  Lab Results   Component Value Date    ABORH A POSITIVE 09/06/2024    LABANTI NEG 09/06/2024       Drug/Infectious Status (If Applicable):  Lab Results   Component Value Date/Time    HEPCAB 0.07 02/29/2024 02:31 PM       COVID-19 Screening (If Applicable): No results found for: \"COVID19\"        Anesthesia Evaluation  Patient summary reviewed and Nursing notes reviewed  Airway: Mallampati: II  TM distance: >3 FB   Neck ROM: full  Mouth opening: > = 3 FB   Dental: normal exam         Pulmonary: breath sounds clear to auscultation  (+)           asthma:                            Cardiovascular:Negative CV

## 2024-09-07 NOTE — ANESTHESIA PROCEDURE NOTES
Spinal Block    Patient location during procedure: OR  End time: 9/7/2024 8:18 AM  Reason for block: primary anesthetic  Staffing  Performed: anesthesiologist   Anesthesiologist: Calos Ellis MD  Performed by: Calos Ellis MD  Authorized by: Calos Ellis MD    Spinal Block  Patient position: sitting  Prep: Betadine  Patient monitoring: cardiac monitor, continuous pulse ox, frequent blood pressure checks and oxygen  Approach: midline  Location: L4/L5  Provider prep: mask and sterile gloves  Needle  Needle type: pencil-tip   Needle gauge: 25 G  Needle length: 4 in  Assessment  Sensory level: T4  Swirl obtained: Yes  CSF: clear  Attempts: 1  Hemodynamics: stable  Preanesthetic Checklist  Completed: patient identified, IV checked, site marked, risks and benefits discussed, surgical/procedural consents, equipment checked, pre-op evaluation, timeout performed, anesthesia consent given, oxygen available, monitors applied/VS acknowledged and fire risk safety assessment completed and verbalized

## 2024-09-07 NOTE — ANESTHESIA POSTPROCEDURE EVALUATION
Department of Anesthesiology  Postprocedure Note    Patient: Dana Merrill  MRN: [id number]  YOB: 2000  Date of evaluation: 2024    Procedure Summary       Date: 24 Room / Location: [facility] L&D 01 / [facility]    Anesthesia Start: 812 Anesthesia Stop: 959    Procedure:  SECTION (E R A S) (Abdomen) Diagnosis:       Delivery by  section using transverse incision of lower segment of uterus      (Delivery by  section using transverse incision of lower segment of uterus [O82])    Surgeons: Charlene Rasheed MD Responsible Provider: Calos Ellis MD    Anesthesia Type: Spinal ASA Status: 2            Anesthesia Type: Spinal    Cristian Phase I:      Cristian Phase II:      Anesthesia Post Evaluation    Patient location during evaluation: PACU  Patient participation: complete - patient participated  Level of consciousness: awake  Airway patency: patent  Nausea & Vomiting: no nausea  Cardiovascular status: blood pressure returned to baseline and hemodynamically stable  Respiratory status: acceptable  Hydration status: stable  Multimodal analgesia pain management approach    No notable events documented.

## 2024-09-08 VITALS
RESPIRATION RATE: 16 BRPM | HEART RATE: 80 BPM | DIASTOLIC BLOOD PRESSURE: 66 MMHG | SYSTOLIC BLOOD PRESSURE: 103 MMHG | TEMPERATURE: 98.3 F | OXYGEN SATURATION: 97 %

## 2024-09-08 LAB
ERYTHROCYTE [DISTWIDTH] IN BLOOD BY AUTOMATED COUNT: 14.5 % (ref 11.5–14.5)
HCT VFR BLD AUTO: 30.6 % (ref 35–47)
HGB BLD-MCNC: 9.9 G/DL (ref 11.5–16)
MCH RBC QN AUTO: 31.7 PG (ref 26–34)
MCHC RBC AUTO-ENTMCNC: 32.4 G/DL (ref 30–36.5)
MCV RBC AUTO: 98.1 FL (ref 80–99)
NRBC # BLD: 0 K/UL (ref 0–0.01)
NRBC BLD-RTO: 0 PER 100 WBC
PLATELET # BLD AUTO: 219 K/UL (ref 150–400)
PMV BLD AUTO: 9.6 FL (ref 8.9–12.9)
RBC # BLD AUTO: 3.12 M/UL (ref 3.8–5.2)
WBC # BLD AUTO: 13.8 K/UL (ref 3.6–11)

## 2024-09-08 PROCEDURE — 6370000000 HC RX 637 (ALT 250 FOR IP): Performed by: OBSTETRICS & GYNECOLOGY

## 2024-09-08 PROCEDURE — 36415 COLL VENOUS BLD VENIPUNCTURE: CPT

## 2024-09-08 PROCEDURE — 85027 COMPLETE CBC AUTOMATED: CPT

## 2024-09-08 PROCEDURE — APPNB30 APP NON BILLABLE TIME 0-30 MINS: Performed by: ADVANCED PRACTICE MIDWIFE

## 2024-09-08 PROCEDURE — 6360000002 HC RX W HCPCS: Performed by: OBSTETRICS & GYNECOLOGY

## 2024-09-08 RX ORDER — PSEUDOEPHEDRINE HCL 30 MG
100 TABLET ORAL 2 TIMES DAILY PRN
Qty: 20 CAPSULE | Refills: 0 | Status: SHIPPED | OUTPATIENT
Start: 2024-09-08 | End: 2024-09-18

## 2024-09-08 RX ORDER — OXYCODONE HYDROCHLORIDE 5 MG/1
5 TABLET ORAL EVERY 6 HOURS PRN
Qty: 20 TABLET | Refills: 0 | Status: SHIPPED | OUTPATIENT
Start: 2024-09-08 | End: 2024-10-08

## 2024-09-08 RX ORDER — IBUPROFEN 800 MG/1
800 TABLET, FILM COATED ORAL EVERY 8 HOURS PRN
Qty: 30 TABLET | Refills: 0 | Status: SHIPPED | OUTPATIENT
Start: 2024-09-08 | End: 2024-09-18

## 2024-09-08 RX ADMIN — KETOROLAC TROMETHAMINE 30 MG: 30 INJECTION, SOLUTION INTRAMUSCULAR at 03:56

## 2024-09-08 RX ADMIN — OXYCODONE HYDROCHLORIDE 5 MG: 5 TABLET ORAL at 02:11

## 2024-09-08 RX ADMIN — DOCUSATE SODIUM 100 MG: 100 CAPSULE, LIQUID FILLED ORAL at 10:13

## 2024-09-08 RX ADMIN — IBUPROFEN 800 MG: 400 TABLET, FILM COATED ORAL at 10:13

## 2024-09-08 RX ADMIN — ACETAMINOPHEN 1000 MG: 500 TABLET ORAL at 10:13

## 2024-09-08 RX ADMIN — ACETAMINOPHEN 1000 MG: 500 TABLET ORAL at 02:04

## 2024-09-08 ASSESSMENT — PAIN SCALES - GENERAL: PAINLEVEL_OUTOF10: 5

## 2024-09-13 ENCOUNTER — OFFICE VISIT (OUTPATIENT)
Age: 24
End: 2024-09-13

## 2024-09-13 VITALS
RESPIRATION RATE: 15 BRPM | HEART RATE: 90 BPM | SYSTOLIC BLOOD PRESSURE: 123 MMHG | OXYGEN SATURATION: 98 % | BODY MASS INDEX: 29.53 KG/M2 | DIASTOLIC BLOOD PRESSURE: 82 MMHG | TEMPERATURE: 98.7 F | HEIGHT: 64 IN | WEIGHT: 173 LBS

## 2024-09-13 DIAGNOSIS — Z98.890 POST-OPERATIVE STATE: Primary | ICD-10-CM

## 2024-09-13 PROCEDURE — 99024 POSTOP FOLLOW-UP VISIT: CPT | Performed by: OBSTETRICS & GYNECOLOGY

## 2024-09-13 RX ORDER — PAROXETINE 10 MG/1
10 TABLET, FILM COATED ORAL DAILY
Qty: 30 TABLET | Refills: 3 | Status: SHIPPED | OUTPATIENT
Start: 2024-09-13

## 2024-09-13 RX ORDER — LIDOCAINE 4 G/G
1 PATCH TOPICAL DAILY
Qty: 30 PATCH | Refills: 0 | Status: SHIPPED | OUTPATIENT
Start: 2024-09-13 | End: 2024-10-13

## 2024-09-13 RX ORDER — CEPHALEXIN 500 MG/1
500 CAPSULE ORAL 4 TIMES DAILY
Qty: 28 CAPSULE | Refills: 0 | Status: SHIPPED | OUTPATIENT
Start: 2024-09-13 | End: 2024-09-20

## 2024-09-23 ENCOUNTER — POSTPARTUM VISIT (OUTPATIENT)
Age: 24
End: 2024-09-23
Payer: COMMERCIAL

## 2024-09-23 VITALS
TEMPERATURE: 98 F | BODY MASS INDEX: 31.24 KG/M2 | DIASTOLIC BLOOD PRESSURE: 67 MMHG | HEIGHT: 64 IN | OXYGEN SATURATION: 98 % | SYSTOLIC BLOOD PRESSURE: 105 MMHG | HEART RATE: 73 BPM | WEIGHT: 183 LBS

## 2024-09-23 DIAGNOSIS — F41.9 ANXIETY: ICD-10-CM

## 2024-09-23 PROCEDURE — 99213 OFFICE O/P EST LOW 20 MIN: CPT | Performed by: OBSTETRICS & GYNECOLOGY

## 2024-10-21 ENCOUNTER — POSTPARTUM VISIT (OUTPATIENT)
Age: 24
End: 2024-10-21

## 2024-10-21 VITALS
SYSTOLIC BLOOD PRESSURE: 118 MMHG | WEIGHT: 186 LBS | HEART RATE: 75 BPM | DIASTOLIC BLOOD PRESSURE: 72 MMHG | BODY MASS INDEX: 31.76 KG/M2 | TEMPERATURE: 98.4 F | HEIGHT: 64 IN | OXYGEN SATURATION: 99 % | RESPIRATION RATE: 14 BRPM

## 2024-10-21 PROCEDURE — 0503F POSTPARTUM CARE VISIT: CPT | Performed by: OBSTETRICS & GYNECOLOGY

## 2024-10-21 NOTE — PROGRESS NOTES
Dana Merrill is a 24 y.o. female returns for a routine post-partum follow-up visit     Chief Complaint   Patient presents with    Routine Prenatal Visit       Postpartum Depression: Low Risk  (2024)    [place]  Depression Scale     Last EPDS Total Score: 4     Last EPDS Self Harm Result: Never         Type of delivery: repeat  section  Date of Delivery: 2024  Breastfeeding: yes  Bleeding Resolved: yes  Birth Control: plan on getting nexplanon  Last Pap: 2022 NILM        Problems: no problems    1. Have you been to the ER, urgent care clinic, or hospitalized since your last visit? No    2. Have you seen or consulted any other health care providers outside of the [facility] since your last visit? No    Patient declined chaperone for today's exam.

## 2024-10-21 NOTE — PROGRESS NOTES
Postpartum Visit    Dana Merrill is a 24 y.o.  presenting for her postpartum visit.      Type of delivery: repeat  section  Date of Delivery: 2024  Breastfeeding: yes  Bleeding Resolved: yes  Birth Control: plan on getting nexplanon  Last Pap: 2022 NILM    Past Medical History:   Diagnosis Date    Anemia     Asthma     Hx of multiple concussions     while playing soccer    Hx of streptococcal infection     7 X last year    Hyperemesis     first and third pregnancies    Postpartum depression 2023    Posttraumatic stress disorder 2018    Psychiatric problem     Bipolar. Anxiety, Depression       Past Surgical History:   Procedure Laterality Date     SECTION       SECTION N/A 2024     SECTION (E R A S) performed by Charlene Rasheed MD at [facility] L&D OR    KNEE ARTHROSCOPY      OTHER SURGICAL HISTORY      Ligament reconstruction in left elbow    OTHER SURGICAL HISTORY      Cyst removal of left ankle    TONSILLECTOMY  2018       Family History   Problem Relation Age of Onset    Breast Cancer Paternal Grandmother     Other Father         incarcerated for drugs and conspiracy to murder       Social History     Socioeconomic History    Marital status: Single     Spouse name: Not on file    Number of children: Not on file    Years of education: Not on file    Highest education level: Not on file   Occupational History    Not on file   Tobacco Use    Smoking status: Never    Smokeless tobacco: Never   Vaping Use    Vaping status: Never Used   Substance and Sexual Activity    Alcohol use: Never    Drug use: Never    Sexual activity: Yes     Partners: Male     Birth control/protection: None     Comment: Same partner for 5 years   Other Topics Concern    Not on file   Social History Narrative    Not on file     Social Determinants of Health     Financial Resource Strain: Not on file   Food Insecurity: No Food Insecurity (2024)    Hunger Vital Sign

## 2024-11-04 NOTE — PROGRESS NOTES
Dana Merrill is a 24 y.o. female presents for a problem visit.    No chief complaint on file.      Problems: contraception       No LMP recorded.    Birth Control: Nexplanon today.    Last Pap: 11/08/2022 NILM        1. Have you been to the ER, urgent care clinic, or hospitalized since your last visit? No    2. Have you seen or consulted any other health care providers outside of the [facility] since your last visit? No      Chart reviewed for the following:   Marlene DAI RN, have reviewed the History, Physical and updated the Allergic reactions for Dana Merrill     TIME OUT performed immediately prior to start of procedure:   Marlene DAI RN, have performed the following reviews on Dana Merrill prior to the start of the procedure:            * Patient was identified by name and date of birth   * Agreement on procedure being performed was verified  * Risks and Benefits explained to the patient  * Procedure site verified and marked as necessary  * Patient was positioned for comfort  * Consent was signed and verified     Time: 1428     Date of procedure: 11/4/2024    Procedure performed by:  Charlene Rasheed MD       Provider assisted by: Marlene Florez RN    Patient assisted by: self    How tolerated by patient: tolerated the procedure well with no complications    Post Procedural Pain Scale: 0 - No Hurt    Comments: none    Examination chaperoned by Marlene Florez RN.

## 2024-11-05 NOTE — PROGRESS NOTES
Procedure note: Nexplanon insertion    Dana Merrill is a ,  24 y.o. female Black /  whose No LMP recorded.  was on  presents for office insertion of an NEXPLANON sub-dermal contraceptive implant.   She has had an opportunity to read the NEXPLANON \"Patient Labeling and Consent Form\". We counseled Dana about the insertion and removal procedures as well as potential side-effects, benefits and risks. She state she had no further questions and signed the consent form.   She has been using abstinence to the present time. She confirmed that she has no allergies to Betadine or Xylocaine. She reclined on the examination table in the supine position with her LEFT arm flexed at the elbow and externally rotated. The insertion site was identified and marked approximately 6-8 cm proximal to the elbow crease at the inner side of the LEFT upper arm over the triceps muscle below the groove between the biceps and triceps muscles. A second renae was placed 6-8 cm above the first renae. The insertion site was cleansed with Betadine antiseptic.   Approximately 2.5 ml of 2% xylocaine with epinephrine was injected just under the skin of the LEFT upper extremity. The NEXPLANON sterile applicator was carefully removed from its blister pack and kept sterile. I removed the needle cap. I visually verified the presence of NEXPLANON inside the needle tip. The skin at the insertion site was then stretched by my thumb and index finger. I then inserted the needle tip through the skin at the appropriate angle to the skin surface, just until the skin has been penetrated. The needle was gently inserted to its full length. The slider was then pushed all the way and then released. I then removed the needle and palpated the implant in the appropriate location. The patient also palpated the implant in place. Both Dana and SUHAS were able to confirm the presence of the NEXPLANON in its subdermal location by palpation.   The skin was

## 2024-11-06 ENCOUNTER — OFFICE VISIT (OUTPATIENT)
Age: 24
End: 2024-11-06
Payer: COMMERCIAL

## 2024-11-06 VITALS
BODY MASS INDEX: 31.24 KG/M2 | SYSTOLIC BLOOD PRESSURE: 120 MMHG | HEIGHT: 64 IN | DIASTOLIC BLOOD PRESSURE: 82 MMHG | WEIGHT: 183 LBS | RESPIRATION RATE: 14 BRPM | OXYGEN SATURATION: 98 % | HEART RATE: 66 BPM | TEMPERATURE: 98.2 F

## 2024-11-06 DIAGNOSIS — Z30.017 NEXPLANON INSERTION: Primary | ICD-10-CM

## 2024-11-06 LAB
HCG, PREGNANCY, URINE, POC: NEGATIVE
VALID INTERNAL CONTROL, POC: YES

## 2024-11-06 PROCEDURE — 81025 URINE PREGNANCY TEST: CPT | Performed by: OBSTETRICS & GYNECOLOGY

## 2024-11-06 PROCEDURE — 11981 INSERTION DRUG DLVR IMPLANT: CPT | Performed by: OBSTETRICS & GYNECOLOGY

## 2024-11-06 RX ORDER — LIDOCAINE HYDROCHLORIDE AND EPINEPHRINE 10; 10 MG/ML; UG/ML
5 INJECTION, SOLUTION INFILTRATION; PERINEURAL ONCE
Status: COMPLETED | OUTPATIENT
Start: 2024-11-06 | End: 2024-11-06

## 2024-11-06 RX ORDER — AZITHROMYCIN 250 MG/1
250 TABLET, FILM COATED ORAL
COMMUNITY
Start: 2024-11-04

## 2024-11-06 RX ORDER — PREDNISONE 20 MG/1
TABLET ORAL
COMMUNITY
Start: 2024-11-04

## 2024-11-06 RX ADMIN — LIDOCAINE HYDROCHLORIDE AND EPINEPHRINE 5 ML: 10; 10 INJECTION, SOLUTION INFILTRATION; PERINEURAL at 14:30

## 2024-12-04 RX ORDER — DICLOXACILLIN SODIUM 250 MG/1
500 CAPSULE ORAL 4 TIMES DAILY
Qty: 80 CAPSULE | Refills: 0 | Status: SHIPPED | OUTPATIENT
Start: 2024-12-04 | End: 2024-12-14
